# Patient Record
Sex: MALE | Race: WHITE | NOT HISPANIC OR LATINO | URBAN - METROPOLITAN AREA
[De-identification: names, ages, dates, MRNs, and addresses within clinical notes are randomized per-mention and may not be internally consistent; named-entity substitution may affect disease eponyms.]

---

## 2017-03-24 ENCOUNTER — OUTPATIENT (OUTPATIENT)
Dept: OUTPATIENT SERVICES | Facility: HOSPITAL | Age: 47
LOS: 1 days | End: 2017-03-24

## 2017-03-24 ENCOUNTER — INPATIENT (INPATIENT)
Facility: HOSPITAL | Age: 47
LOS: 9 days | Discharge: HOME | End: 2017-04-03
Payer: COMMERCIAL

## 2017-03-24 PROCEDURE — 71010: CPT | Mod: 26

## 2017-03-24 PROCEDURE — 73630 X-RAY EXAM OF FOOT: CPT | Mod: 26,LT

## 2017-03-24 PROCEDURE — 93971 EXTREMITY STUDY: CPT | Mod: 26,LT

## 2017-03-24 PROCEDURE — 99285 EMERGENCY DEPT VISIT HI MDM: CPT | Mod: 25

## 2017-03-24 PROCEDURE — 73700 CT LOWER EXTREMITY W/O DYE: CPT | Mod: 26,LT

## 2017-03-24 PROCEDURE — 73720 MRI LWR EXTREMITY W/O&W/DYE: CPT | Mod: 26,LT

## 2017-03-25 ENCOUNTER — OUTPATIENT (OUTPATIENT)
Dept: OUTPATIENT SERVICES | Facility: HOSPITAL | Age: 47
LOS: 1 days | End: 2017-03-25

## 2017-03-26 ENCOUNTER — OUTPATIENT (OUTPATIENT)
Dept: OUTPATIENT SERVICES | Facility: HOSPITAL | Age: 47
LOS: 1 days | End: 2017-03-26

## 2017-03-26 PROCEDURE — 74177 CT ABD & PELVIS W/CONTRAST: CPT | Mod: 26

## 2017-03-27 ENCOUNTER — OUTPATIENT (OUTPATIENT)
Dept: OUTPATIENT SERVICES | Facility: HOSPITAL | Age: 47
LOS: 1 days | End: 2017-03-27

## 2017-03-28 ENCOUNTER — OUTPATIENT (OUTPATIENT)
Dept: OUTPATIENT SERVICES | Facility: HOSPITAL | Age: 47
LOS: 1 days | End: 2017-03-28

## 2017-03-28 PROCEDURE — 93926 LOWER EXTREMITY STUDY: CPT | Mod: 26,LT

## 2017-03-29 ENCOUNTER — OUTPATIENT (OUTPATIENT)
Dept: OUTPATIENT SERVICES | Facility: HOSPITAL | Age: 47
LOS: 1 days | End: 2017-03-29

## 2017-03-30 ENCOUNTER — OUTPATIENT (OUTPATIENT)
Dept: OUTPATIENT SERVICES | Facility: HOSPITAL | Age: 47
LOS: 1 days | End: 2017-03-30

## 2017-03-31 ENCOUNTER — OUTPATIENT (OUTPATIENT)
Dept: OUTPATIENT SERVICES | Facility: HOSPITAL | Age: 47
LOS: 1 days | End: 2017-03-31

## 2017-04-01 ENCOUNTER — OUTPATIENT (OUTPATIENT)
Dept: OUTPATIENT SERVICES | Facility: HOSPITAL | Age: 47
LOS: 1 days | End: 2017-04-01

## 2017-04-02 ENCOUNTER — OUTPATIENT (OUTPATIENT)
Dept: OUTPATIENT SERVICES | Facility: HOSPITAL | Age: 47
LOS: 1 days | End: 2017-04-02

## 2017-05-15 ENCOUNTER — TRANSCRIPTION ENCOUNTER (OUTPATIENT)
Age: 47
End: 2017-05-15

## 2017-05-15 ENCOUNTER — OUTPATIENT (OUTPATIENT)
Dept: OUTPATIENT SERVICES | Facility: HOSPITAL | Age: 47
LOS: 1 days | End: 2017-05-15
Payer: COMMERCIAL

## 2017-05-15 VITALS
DIASTOLIC BLOOD PRESSURE: 93 MMHG | TEMPERATURE: 98 F | HEART RATE: 90 BPM | HEIGHT: 72.5 IN | SYSTOLIC BLOOD PRESSURE: 143 MMHG | OXYGEN SATURATION: 97 % | WEIGHT: 315 LBS | RESPIRATION RATE: 12 BRPM

## 2017-05-15 VITALS
RESPIRATION RATE: 14 BRPM | DIASTOLIC BLOOD PRESSURE: 72 MMHG | HEART RATE: 88 BPM | OXYGEN SATURATION: 93 % | SYSTOLIC BLOOD PRESSURE: 107 MMHG

## 2017-05-15 DIAGNOSIS — H33.41 TRACTION DETACHMENT OF RETINA, RIGHT EYE: ICD-10-CM

## 2017-05-15 DIAGNOSIS — Z89.9 ACQUIRED ABSENCE OF LIMB, UNSPECIFIED: Chronic | ICD-10-CM

## 2017-05-15 DIAGNOSIS — Z98.890 OTHER SPECIFIED POSTPROCEDURAL STATES: Chronic | ICD-10-CM

## 2017-05-15 PROCEDURE — C1889: CPT

## 2017-05-15 PROCEDURE — 67043 VIT FOR MEMBRANE DISSECT: CPT | Mod: LT

## 2017-05-15 NOTE — ASU PATIENT PROFILE, ADULT - PMH
Diabetes    HTN (hypertension)    MRSA infection  s/p  Open wound  left foot being treated in hyperbaric chamber  ANGY on CPAP    Pulmonary embolism  double pe july 2016

## 2017-05-15 NOTE — ASU DISCHARGE PLAN (ADULT/PEDIATRIC). - NOTIFY
Inability to Tolerate Liquids or Foods/Persistent Nausea and Vomiting/Unable to Urinate/Pain not relieved by Medications/Swelling that continues/Bleeding that does not stop/Fever greater than 101/Increased Irritability or Sluggishness

## 2017-05-15 NOTE — ASU DISCHARGE PLAN (ADULT/PEDIATRIC). - YOU WERE IN THE HOSPITAL FOR:
Left eye surgery for left eye vitreous hemorrhage and tractional retina detachment in the setting of proliferative diabetic retinopathy

## 2019-07-25 ENCOUNTER — HOSPITAL ENCOUNTER (INPATIENT)
Dept: HOSPITAL 35 - ER | Age: 49
LOS: 6 days | Discharge: HOME HEALTH | DRG: 616 | End: 2019-07-31
Attending: HOSPITALIST | Admitting: HOSPITALIST
Payer: COMMERCIAL

## 2019-07-25 VITALS — SYSTOLIC BLOOD PRESSURE: 145 MMHG | DIASTOLIC BLOOD PRESSURE: 85 MMHG

## 2019-07-25 VITALS — HEIGHT: 72.99 IN | WEIGHT: 315 LBS | BODY MASS INDEX: 41.75 KG/M2

## 2019-07-25 VITALS — SYSTOLIC BLOOD PRESSURE: 147 MMHG | DIASTOLIC BLOOD PRESSURE: 85 MMHG

## 2019-07-25 VITALS — DIASTOLIC BLOOD PRESSURE: 92 MMHG | SYSTOLIC BLOOD PRESSURE: 156 MMHG

## 2019-07-25 VITALS — DIASTOLIC BLOOD PRESSURE: 93 MMHG | SYSTOLIC BLOOD PRESSURE: 153 MMHG

## 2019-07-25 DIAGNOSIS — E43: ICD-10-CM

## 2019-07-25 DIAGNOSIS — E11.42: ICD-10-CM

## 2019-07-25 DIAGNOSIS — E66.9: ICD-10-CM

## 2019-07-25 DIAGNOSIS — Z89.422: ICD-10-CM

## 2019-07-25 DIAGNOSIS — L03.116: ICD-10-CM

## 2019-07-25 DIAGNOSIS — Z23: ICD-10-CM

## 2019-07-25 DIAGNOSIS — Z86.711: ICD-10-CM

## 2019-07-25 DIAGNOSIS — E11.628: ICD-10-CM

## 2019-07-25 DIAGNOSIS — Z86.718: ICD-10-CM

## 2019-07-25 DIAGNOSIS — E11.69: Primary | ICD-10-CM

## 2019-07-25 DIAGNOSIS — Z79.899: ICD-10-CM

## 2019-07-25 DIAGNOSIS — M86.172: ICD-10-CM

## 2019-07-25 DIAGNOSIS — I96: ICD-10-CM

## 2019-07-25 DIAGNOSIS — E11.621: ICD-10-CM

## 2019-07-25 DIAGNOSIS — I10: ICD-10-CM

## 2019-07-25 DIAGNOSIS — F32.9: ICD-10-CM

## 2019-07-25 LAB
ALBUMIN SERPL-MCNC: 2.7 G/DL (ref 3.4–5)
ALT SERPL-CCNC: 10 U/L (ref 30–65)
ANION GAP SERPL CALC-SCNC: 10 MMOL/L (ref 7–16)
AST SERPL-CCNC: 8 U/L (ref 15–37)
BASOPHILS NFR BLD AUTO: 0.7 % (ref 0–2)
BILIRUB SERPL-MCNC: 0.2 MG/DL
BILIRUB UR-MCNC: NEGATIVE MG/DL
BUN SERPL-MCNC: 16 MG/DL (ref 7–18)
CALCIUM SERPL-MCNC: 9.4 MG/DL (ref 8.5–10.1)
CHLORIDE SERPL-SCNC: 104 MMOL/L (ref 98–107)
CO2 SERPL-SCNC: 25 MMOL/L (ref 21–32)
COLOR UR: YELLOW
CREAT SERPL-MCNC: 0.9 MG/DL (ref 0.7–1.3)
EOSINOPHIL NFR BLD: 0.8 % (ref 0–3)
ERYTHROCYTE [DISTWIDTH] IN BLOOD BY AUTOMATED COUNT: 13.9 % (ref 10.5–14.5)
GLUCOSE SERPL-MCNC: 164 MG/DL (ref 74–106)
GRANULOCYTES NFR BLD MANUAL: 69.9 % (ref 36–66)
HCT VFR BLD CALC: 39.9 % (ref 42–52)
HGB BLD-MCNC: 13.2 GM/DL (ref 14–18)
KETONES UR STRIP-MCNC: NEGATIVE MG/DL
LYMPHOCYTES NFR BLD AUTO: 21.4 % (ref 24–44)
MCH RBC QN AUTO: 28.8 PG (ref 26–34)
MCHC RBC AUTO-ENTMCNC: 33.1 G/DL (ref 28–37)
MCV RBC: 86.9 FL (ref 80–100)
MONOCYTES NFR BLD: 7.2 % (ref 1–8)
NEUTROPHILS # BLD: 7.4 THOU/UL (ref 1.4–8.2)
PLATELET # BLD: 328 THOU/UL (ref 150–400)
POTASSIUM SERPL-SCNC: 4.5 MMOL/L (ref 3.5–5.1)
PROT SERPL-MCNC: 8.3 G/DL (ref 6.4–8.2)
RBC # BLD AUTO: 4.6 MIL/UL (ref 4.5–6)
RBC # UR STRIP: (no result) /UL
SODIUM SERPL-SCNC: 139 MMOL/L (ref 136–145)
SP GR UR STRIP: 1.01 (ref 1–1.03)
SQUAMOUS: (no result) /LPF (ref 0–3)
URINE CLARITY: CLEAR
URINE GLUCOSE-RANDOM*: (no result)
URINE LEUKOCYTES-REFLEX: NEGATIVE
URINE NITRITE-REFLEX: NEGATIVE
URINE PROTEIN (DIPSTICK): (no result)
URINE WBC-REFLEX: (no result) /HPF (ref 0–5)
UROBILINOGEN UR STRIP-ACNC: 0.2 E.U./DL (ref 0.2–1)
WBC # BLD AUTO: 10.5 THOU/UL (ref 4–11)

## 2019-07-25 PROCEDURE — 57091: CPT

## 2019-07-25 PROCEDURE — 56527: CPT

## 2019-07-25 PROCEDURE — 50386 REMOVE STENT VIA TRANSURETH: CPT

## 2019-07-25 PROCEDURE — 62900: CPT

## 2019-07-25 PROCEDURE — 10084: CPT

## 2019-07-25 PROCEDURE — 56528: CPT

## 2019-07-25 PROCEDURE — 70005: CPT

## 2019-07-25 PROCEDURE — 50010 RENAL EXPLORATION: CPT

## 2019-07-25 PROCEDURE — 62110: CPT

## 2019-07-25 PROCEDURE — 27000 TENOTOMY ADDUCTOR HIP PERQ: CPT

## 2019-07-25 PROCEDURE — 50101: CPT

## 2019-07-25 PROCEDURE — 56526: CPT

## 2019-07-25 NOTE — NUR
ASSUMED CARE AT 1700, ADMISSION HISTORY AND EDUCATION DONE, MEDS GIVEN, ORDER
ACKNOWLEDGED. WOUND PICTURE TAKEN. WILL CONTINUE TO ASSESS AND ASSIST WITH
ADLs AS NEEDED.

## 2019-07-26 VITALS — SYSTOLIC BLOOD PRESSURE: 167 MMHG | DIASTOLIC BLOOD PRESSURE: 87 MMHG

## 2019-07-26 VITALS — DIASTOLIC BLOOD PRESSURE: 85 MMHG | SYSTOLIC BLOOD PRESSURE: 133 MMHG

## 2019-07-26 VITALS — SYSTOLIC BLOOD PRESSURE: 130 MMHG | DIASTOLIC BLOOD PRESSURE: 80 MMHG

## 2019-07-26 VITALS — SYSTOLIC BLOOD PRESSURE: 165 MMHG | DIASTOLIC BLOOD PRESSURE: 101 MMHG

## 2019-07-26 LAB
ANION GAP SERPL CALC-SCNC: 7 MMOL/L (ref 7–16)
BASOPHILS NFR BLD AUTO: 0.7 % (ref 0–2)
BUN SERPL-MCNC: 15 MG/DL (ref 7–18)
CALCIUM SERPL-MCNC: 9.2 MG/DL (ref 8.5–10.1)
CHLORIDE SERPL-SCNC: 105 MMOL/L (ref 98–107)
CO2 SERPL-SCNC: 26 MMOL/L (ref 21–32)
CREAT SERPL-MCNC: 1 MG/DL (ref 0.7–1.3)
EOSINOPHIL NFR BLD: 1.5 % (ref 0–3)
ERYTHROCYTE [DISTWIDTH] IN BLOOD BY AUTOMATED COUNT: 13.7 % (ref 10.5–14.5)
GLUCOSE SERPL-MCNC: 103 MG/DL (ref 74–106)
GRANULOCYTES NFR BLD MANUAL: 64.6 % (ref 36–66)
HCT VFR BLD CALC: 38.2 % (ref 42–52)
HGB BLD-MCNC: 12.5 GM/DL (ref 14–18)
LYMPHOCYTES NFR BLD AUTO: 24.7 % (ref 24–44)
MAGNESIUM SERPL-MCNC: 1.9 MG/DL (ref 1.8–2.4)
MCH RBC QN AUTO: 28.6 PG (ref 26–34)
MCHC RBC AUTO-ENTMCNC: 32.7 G/DL (ref 28–37)
MCV RBC: 87.5 FL (ref 80–100)
MONOCYTES NFR BLD: 8.5 % (ref 1–8)
NEUTROPHILS # BLD: 5.3 THOU/UL (ref 1.4–8.2)
PLATELET # BLD: 310 THOU/UL (ref 150–400)
POTASSIUM SERPL-SCNC: 5.3 MMOL/L (ref 3.5–5.1)
RBC # BLD AUTO: 4.36 MIL/UL (ref 4.5–6)
SODIUM SERPL-SCNC: 138 MMOL/L (ref 136–145)
WBC # BLD AUTO: 8.3 THOU/UL (ref 4–11)

## 2019-07-26 NOTE — NUR
WOUND CONSULT;
ROUNDING WITH DR TRAN AND STEPHANIE RN.  THE RIGHT GREAT TOE IS GROSSLY
INFECTED.  THE PATIENT IS MISSING TOES ON THE SAME FOOT FROM A PROIR INFECTION
AND IS WELL VERSED.  SEE DR TRAN'S NOTE.
 
RECOMMENDATION; XEROFORM, KERLIX FOR NOW.  SURGERY IS LIKLEY.
 
DISCUSSED WITH STAFF

## 2019-07-26 NOTE — NUR
Pt assessed d/t screening risk for identified wound admit and BMI >40.
Provider notes state L great toe infection. Per radiology impression,
developing osteomyelitis not excluded. Hx: DM, diabetic foot ulcer, hx MSRA,
osteo, PE/DVT, HTN. Met with pt at beside. He states appetite has been down
the last few days "because of what is currently going on" (re: infection). Ate
20% dinner last night. Weight reduction not appropriate this visit, focused
heavily on good nutrition, protein focus. Identified quality protein foods to
choose, 1-2 sources/meal, and prioritizing protein first at meals while low
appetite persists. He denies DM education d/t past education/understanding. AM
BG 93 mg/dl. K+ currently high at 5.3. Malnutrition documented in provider
note. Defer diagnosis at this time. No nutrition interventions desired by pt.
Low nutrition risk at this time w / nutrition ed completed. Follow po trends.

## 2019-07-26 NOTE — NUR
DEISYMD PT CARE 1900. PT ALERT AND ORIENTED. ADMISSION ASSESSMENT COMPLETE. IV
DRESSING C/D/I. REPORTS PAIN, SEE EMAR. DENIES N/V. WET TO DRY DRESSING
APPLIED TO TOE WOUND. CALL LIGHT AND PERSONAL BELONINGS WITHIN REACH, WILL
CONTINUE POC UNTIL EOS.

## 2019-07-26 NOTE — NUR
PATIENT DISCHARGED FROM OT AT THIS TIME. IF PATIENT HAPPENS TO NEED A TOE
AMPUTATION, WILL NEED NEW OT ORDERS TO RE-EVALUATE.

## 2019-07-26 NOTE — NUR
ASSESSMENT-PT LIVES IN AN APT ALONE.  HE WALKS O N HIS OWN AND DOES HIS OWN
ADLS.  PT WAS WORKING AND DRIVING PRIOR TO ADMISSION.  PT HAS HIS GIRLFRIEND
IN THE AREA.  CASE DISCUSSED WITH ATTENDING DR AND AWAITING WOUND CARE REC.
FOR FURTHER DC PLANS AT THIS TIME.  FOLLOWING TO ASSIST AS NEEDED.

## 2019-07-27 VITALS — SYSTOLIC BLOOD PRESSURE: 125 MMHG | DIASTOLIC BLOOD PRESSURE: 71 MMHG

## 2019-07-27 VITALS — DIASTOLIC BLOOD PRESSURE: 72 MMHG | SYSTOLIC BLOOD PRESSURE: 134 MMHG

## 2019-07-27 VITALS — SYSTOLIC BLOOD PRESSURE: 130 MMHG | DIASTOLIC BLOOD PRESSURE: 62 MMHG

## 2019-07-27 VITALS — DIASTOLIC BLOOD PRESSURE: 75 MMHG | SYSTOLIC BLOOD PRESSURE: 107 MMHG

## 2019-07-27 PROCEDURE — 0Y6N0ZB DETACHMENT AT LEFT FOOT, PARTIAL 2ND RAY, OPEN APPROACH: ICD-10-PCS | Performed by: ORTHOPAEDIC SURGERY

## 2019-07-27 PROCEDURE — 0Y6N0Z9 DETACHMENT AT LEFT FOOT, PARTIAL 1ST RAY, OPEN APPROACH: ICD-10-PCS | Performed by: ORTHOPAEDIC SURGERY

## 2019-07-27 NOTE — NUR
PATIENT ARE WAS ASSUMED AT 0715.PATIENT IS ALERT AND ORIENTED X4.PT HAS BEEN
NPO AFTER MIDNIGHT, EXCEPT FOR A FEW MEDS IN THE AM.PATIENT IS SCHEDULED FOR
SURGERY FOR L GREAT TOE AMPUTATION BY DR. Kaba.PATIENT'S VITALS ARE STABLE,
AT ROOM AIR,ACCU CHECK IS AT 77, NO INSULIN GIVEN,AND IV IS PATENT AND SALINE
LOCKED.PATIENT HAS COMPLAINTS OF PAIN 10/10, PATIENT WAS GIVEN IV PAIN
MEDS.PATIENT'S SURGERY CONSENT WAS SIGNED AND IN FRONT OF THE CHART.

## 2019-07-27 NOTE — NUR
ASSUMED PT CARE 1900. PT ALERT AND ORIENTED. REASSESSMENT COMPLETE. VSS. IV
DRESSING C/D/I. DENIES N/V. REPORTS PAIN, SEE EMAR. CALL LIGHT WITHIN REACH,
WILL CONTINUE POC UNTIL EOS

## 2019-07-28 VITALS — SYSTOLIC BLOOD PRESSURE: 130 MMHG | DIASTOLIC BLOOD PRESSURE: 77 MMHG

## 2019-07-28 VITALS — SYSTOLIC BLOOD PRESSURE: 157 MMHG | DIASTOLIC BLOOD PRESSURE: 77 MMHG

## 2019-07-28 VITALS — SYSTOLIC BLOOD PRESSURE: 153 MMHG | DIASTOLIC BLOOD PRESSURE: 88 MMHG

## 2019-07-28 VITALS — SYSTOLIC BLOOD PRESSURE: 112 MMHG | DIASTOLIC BLOOD PRESSURE: 69 MMHG

## 2019-07-28 LAB
ANION GAP SERPL CALC-SCNC: 12 MMOL/L (ref 7–16)
BUN SERPL-MCNC: 19 MG/DL (ref 7–18)
CALCIUM SERPL-MCNC: 9 MG/DL (ref 8.5–10.1)
CHLORIDE SERPL-SCNC: 104 MMOL/L (ref 98–107)
CO2 SERPL-SCNC: 24 MMOL/L (ref 21–32)
CREAT SERPL-MCNC: 1 MG/DL (ref 0.7–1.3)
ERYTHROCYTE [DISTWIDTH] IN BLOOD BY AUTOMATED COUNT: 13.8 % (ref 10.5–14.5)
GLUCOSE SERPL-MCNC: 101 MG/DL (ref 74–106)
HCT VFR BLD CALC: 38.9 % (ref 42–52)
HGB BLD-MCNC: 12.7 GM/DL (ref 14–18)
MCH RBC QN AUTO: 28.4 PG (ref 26–34)
MCHC RBC AUTO-ENTMCNC: 32.7 G/DL (ref 28–37)
MCV RBC: 86.9 FL (ref 80–100)
PLATELET # BLD: 342 THOU/UL (ref 150–400)
POTASSIUM SERPL-SCNC: 4.7 MMOL/L (ref 3.5–5.1)
RBC # BLD AUTO: 4.47 MIL/UL (ref 4.5–6)
SODIUM SERPL-SCNC: 140 MMOL/L (ref 136–145)
WBC # BLD AUTO: 8.1 THOU/UL (ref 4–11)

## 2019-07-28 NOTE — NUR
Assumed care of pt at 0700. Pt alert and oriented x4. Dressing clean and
intact on left foot. Non-weight bearing on the left lower extremity. Pain
controlled with prn pain medications. Call light within reach. Will continue
to monitor.

## 2019-07-28 NOTE — NUR
ASSUMED PT CARE 19+00. PT ALERT AND ORIENTED. REASSESSMENT COMPLETE. VSS. I
DRESSING C/D/I. DRESSUING TO L FOOT C/D/I. PT REPORTS PAIN, SEE EMAR. DENIES
N/V. CALL LIGHT WITHIN REACH, WILL CONTINUE POC UNTIL EOS.

## 2019-07-29 VITALS — SYSTOLIC BLOOD PRESSURE: 161 MMHG | DIASTOLIC BLOOD PRESSURE: 100 MMHG

## 2019-07-29 VITALS — DIASTOLIC BLOOD PRESSURE: 82 MMHG | SYSTOLIC BLOOD PRESSURE: 158 MMHG

## 2019-07-29 VITALS — DIASTOLIC BLOOD PRESSURE: 77 MMHG | SYSTOLIC BLOOD PRESSURE: 160 MMHG

## 2019-07-29 PROCEDURE — 02HV33Z INSERTION OF INFUSION DEVICE INTO SUPERIOR VENA CAVA, PERCUTANEOUS APPROACH: ICD-10-PCS | Performed by: RADIOLOGY

## 2019-07-29 NOTE — NUR
FAXED IV ABX ORDERS TO [facility] WITH [facility], ID PROGRESS NOTE AND NOTIFIED [facility] OF
NEED FOR IV ABX AT DC.  [employer] TO ISSUE A ROLLER WALKER FOR HOME ONCE
DC DATE IS KNOWN.

## 2019-07-29 NOTE — NUR
ASSUMED CARE OF PT AT 0700. ASSESSMENT AS CHARTED. A&O,X4. C/O 10/10 LEFT FOOT
AND TOE PAIN POD2, PAIN MEDS GIVEN AS ORDERED. SURGICAL DRESSING INTACT,
CHANGED TODAY BY WOUND NURSE. NEW DAILY DRESSING CHANGES ORDERED. ALFRED MORALESE. PT
UP TO CHAIR WITH P.T. TODAY. ACHS, NO INSULIN GIVEN PER SLIDING SCALE. VSS.
WILL CONTINUE TO MONITOR FREQUENTLY UNTIL EOS.

## 2019-07-29 NOTE — NUR
CONSULTED TO PLACE A PICC FOR A PATIENT DISCHARGING ON HOME IV ANTIBIOTICS.
ORDER AND CONSENT NOTED. THE PROCEDURE AS WELL AS BENIFITS AND RISK FOR DVT
AND INFECTION DISCUSSED. HE VERBALIZED UNDERSTANDING. THE LEFT UPPER ARM
BASILIC WAS WIDLEY PATENT. A #4F SINGLE LUMEN POWER PICC WAS PLACED PER
HOSPITAL POLICY AFTER A BEDSIDE TIMEOUT WAS COMPLETE. LINE WAS 55CM AND
ADVANCED WITHOUT DIFFICULTY. A STAT CHEST XRAY WAS ORDERED TO CONFIRM
PLACEMENT

## 2019-07-29 NOTE — NUR
A VERY DELIGHTFUL GENTLEMAN. LEFT GREAT TOE AMPUTATION SITE DRESSING DRY AND
INTACT. FOOT WARM AND PINK. DILAUDID FOR PAIN. SLEPT AT INTERVALS TONIGHT
VOIDING. WILL CONT TO MONITOR.

## 2019-07-29 NOTE — NUR
Spoke with PT today who reports patient needs a bariatric knee scooter not
offered here at [facility]. Patient weight 350. Sp with [facility] they do not
have any Knee scooters. Sp with [facility] they have no bariatric scooters
encouraged casemgt to contact [employer], they do not contract with [facility]. sp
[facility]. Cont to inquire into
company provider.

## 2019-07-30 VITALS — DIASTOLIC BLOOD PRESSURE: 83 MMHG | SYSTOLIC BLOOD PRESSURE: 136 MMHG

## 2019-07-30 VITALS — SYSTOLIC BLOOD PRESSURE: 136 MMHG | DIASTOLIC BLOOD PRESSURE: 83 MMHG

## 2019-07-30 VITALS — SYSTOLIC BLOOD PRESSURE: 149 MMHG | DIASTOLIC BLOOD PRESSURE: 96 MMHG

## 2019-07-30 VITALS — SYSTOLIC BLOOD PRESSURE: 116 MMHG | DIASTOLIC BLOOD PRESSURE: 72 MMHG

## 2019-07-30 NOTE — NUR
IV NURSE HERE TO SHOW PT HOW TO USE PICC LINE FOR ANTIBIOTICS. ADM DILAUDID
1.5MG IV FOR PAIN TO LEFT FOOT. PT HAS BEEN UP IN CHAIR SINCE AROUND NOON.

## 2019-07-30 NOTE — NUR
PROVIDED PT WITH HIS IV ABX COVERAGE AS FOLLOWS HE Has  A 81559 ded which he
has met $160.09 and a $3500 out of pocket max which he has met $1755.56, he
understands that depening on who bills first the hospital or the infusion
company until he meets deductible his cost will be $701.05 then his 20% cost
will drop to $140.21 per week.  HE IS NOT INTERESTED IN GOING TO A SKILLED
UNIT.  [employer] ISSUED HIM A HEAVY DUTY ROLLER WALKER.  HE SAYS HIS
GIRLFRIEND WILL BE ABLE TO TRANSPORT HIM HOME.  GIRLFRIEND IS A NURSE
PRACTIONER.  PT HAS DONE IV ABX IN THE PAST.  ALERTED [facility] TO START CARE
TOMORROW.  PT WILL BE COMFORTABLE DOING HIS 2300 DOSE TONIGHT.  STILL AWAITING
FINAL DC ORDERS FOR TODAY.  TALA FROM [employer] HERE TO DO IV ABX TEACHING
THIS AFTERNOON.

## 2019-07-30 NOTE — NUR
PT LYING IN BED. PT COMPLAINING OF PAIN TO LEFT FOOT OF 10 ON 1-10 SCALE. PT
HAS PEDAL PULSE TO LEFT FOOT +2. NO SWELLING NOTICED. PT HAS PICC LINE TO LEFT
UPPER ARM. PT NOT ABLE TO BEAR WEIGHT TO LEFT FOOT. PT STATED LAST BM 2 DAYS
AGO. PT HAS BOWEL SOUNDS. GIVING PO HYDROCODONE, PT STATED HE WANTS DILAUDID
NOW. ENCOURAGED PT TO TAKE PO MED SO IT WILL LAST LONGER. PT TAKING DILAUDID
ON REG. BASIS. REMINDED PT HE WILL NOT BE ABLE TO TAKE IV PAIN MED AT HOME. PT
LUNGS CLEAR. ADM HYDROCODONE 5MG PO AT 0954, THEN DILAUDID 1.5MG IV AT 1001.

## 2019-07-30 NOTE — NUR
ADM DILAUDID 1.5MG IV FOR PAIN TO LEFT FOOT OF 10 ON 1-10 SCALE. HIS RIDE WILL
NOT BE ABLE TO GET HIM UNTIL REAL LATE TONIGHT, GIRLFRIEND FLIGHT DELAYED AT
THIS TIME. PT HAS BEEN UP IN CHAIR SINCE 1300.

## 2019-07-31 VITALS — SYSTOLIC BLOOD PRESSURE: 136 MMHG | DIASTOLIC BLOOD PRESSURE: 83 MMHG

## 2019-07-31 VITALS — SYSTOLIC BLOOD PRESSURE: 174 MMHG | DIASTOLIC BLOOD PRESSURE: 62 MMHG

## 2019-07-31 VITALS — DIASTOLIC BLOOD PRESSURE: 93 MMHG | SYSTOLIC BLOOD PRESSURE: 146 MMHG

## 2019-07-31 VITALS — SYSTOLIC BLOOD PRESSURE: 128 MMHG | DIASTOLIC BLOOD PRESSURE: 75 MMHG

## 2019-07-31 NOTE — NUR
DISCHARGE PAPERS GONE OVER SIGNED AND COPY IN CHART. PICC IV IN PLACE TO LEFT
UPPER ARM. PT TO HAVE HOME HEALTH AND IV ABT. ALL BELONGINGS PACKED AND SENT
WITH PATIENT.

## 2019-07-31 NOTE — NUR
ORDER FOR HEEL/TOE OFFLOADING BOOT OBTAINED AND FAXED TO [facility] REP HERE TO
DELIVER ITEM AND PLACE ON PT'S FOOT.  PHY TX HERE TO MAKE SURE PT AWARE HE IS
TO BE NON-WT BEARING BUT CAN USE THIS FOR BALANCE ONLY WITH WALKER.  DISCUSSED
WITH ANANYA ALVARES NP.

## 2019-07-31 NOTE — NUR
ASSUMED CARE OF PT @1900. PT ASSESSED AT START OF SHIFT SITTING UP IN CHAIR
PT A&OX4 WITH C/O LEFT FOOT PAIN. PAIN MED GIVEN FOR MANAGEMENT SEE EMAR.
ABX INFUSING AND POC DONE. GIRLFRIEND ARRIVED TO UNIT AND AT BEDSIDE FOR THE
NIGHT. @0130 PT TRANSFERRED BACK IN BED FOR THE NIGHT. WILL CONTINUE TO
MONITOR TILL EOS

## 2019-07-31 NOTE — NUR
BARIATRIC KNEE SCOOTER LOCATED ON [employer] AND ALSO ON [employer] WEBSITE FOR
AROUNF $145.  PT WAS PROVIDED THIS INFORMTION.  FINAL ORDERS FAXED TO [facility]
CARE INFUSION THIS AM.

## 2019-08-07 ENCOUNTER — HOSPITAL ENCOUNTER (OUTPATIENT)
Dept: HOSPITAL 35 - HYPER | Age: 49
End: 2019-08-07
Attending: SPECIALIST
Payer: COMMERCIAL

## 2019-08-07 DIAGNOSIS — T87.89: Primary | ICD-10-CM

## 2019-08-07 DIAGNOSIS — E40: ICD-10-CM

## 2019-08-07 DIAGNOSIS — E11.40: ICD-10-CM

## 2019-08-07 DIAGNOSIS — M86.372: ICD-10-CM

## 2019-08-07 DIAGNOSIS — E11.69: ICD-10-CM

## 2019-08-07 DIAGNOSIS — R60.0: ICD-10-CM

## 2019-08-07 DIAGNOSIS — Z86.718: ICD-10-CM

## 2019-08-07 DIAGNOSIS — E11.621: ICD-10-CM

## 2019-08-07 DIAGNOSIS — L03.116: ICD-10-CM
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
## 2019-08-07 DIAGNOSIS — L97.512: ICD-10-CM

## 2019-08-07 DIAGNOSIS — Z86.711: ICD-10-CM

## 2019-08-07 DIAGNOSIS — Z79.84: ICD-10-CM

## 2019-08-15 ENCOUNTER — HOSPITAL ENCOUNTER (OUTPATIENT)
Dept: HOSPITAL 35 - HYPER | Age: 49
End: 2019-08-15
Attending: NURSE PRACTITIONER
Payer: COMMERCIAL

## 2019-08-15 DIAGNOSIS — L03.116: ICD-10-CM

## 2019-08-15 DIAGNOSIS — Z89.422: ICD-10-CM

## 2019-08-15 DIAGNOSIS — M86.372: ICD-10-CM

## 2019-08-15 DIAGNOSIS — Z86.711: ICD-10-CM

## 2019-08-15 DIAGNOSIS — L03.115: ICD-10-CM

## 2019-08-15 DIAGNOSIS — E11.621: ICD-10-CM

## 2019-08-15 DIAGNOSIS — L97.512: ICD-10-CM

## 2019-08-15 DIAGNOSIS — Z86.718: ICD-10-CM

## 2019-08-15 DIAGNOSIS — E11.40: ICD-10-CM

## 2019-08-15 DIAGNOSIS — R60.0: ICD-10-CM

## 2019-08-15 DIAGNOSIS — E11.69: ICD-10-CM
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
## 2019-08-15 DIAGNOSIS — T81.89XD: Primary | ICD-10-CM

## 2019-08-15 DIAGNOSIS — Z79.84: ICD-10-CM

## 2019-08-15 DIAGNOSIS — E44.0: ICD-10-CM

## 2019-09-05 ENCOUNTER — HOSPITAL ENCOUNTER (OUTPATIENT)
Dept: HOSPITAL 35 - HYPER | Age: 49
End: 2019-09-05
Attending: PREVENTIVE MEDICINE
Payer: COMMERCIAL

## 2019-09-05 DIAGNOSIS — L84: ICD-10-CM

## 2019-09-05 DIAGNOSIS — Z86.718: ICD-10-CM
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
## 2019-09-05 DIAGNOSIS — L03.115: ICD-10-CM

## 2019-09-05 DIAGNOSIS — M86.372: ICD-10-CM

## 2019-09-05 DIAGNOSIS — L03.116: ICD-10-CM

## 2019-09-05 DIAGNOSIS — E11.69: ICD-10-CM

## 2019-09-05 DIAGNOSIS — E11.621: ICD-10-CM

## 2019-09-05 DIAGNOSIS — L97.512: ICD-10-CM

## 2019-09-05 DIAGNOSIS — E11.40: ICD-10-CM

## 2019-09-05 DIAGNOSIS — R60.0: ICD-10-CM

## 2019-09-05 DIAGNOSIS — T81.89XD: Primary | ICD-10-CM

## 2019-09-05 DIAGNOSIS — Z89.422: ICD-10-CM

## 2019-09-05 DIAGNOSIS — Z79.84: ICD-10-CM

## 2019-09-05 DIAGNOSIS — E44.0: ICD-10-CM

## 2019-12-12 ENCOUNTER — HOSPITAL ENCOUNTER (OUTPATIENT)
Dept: HOSPITAL 35 - HYPER | Age: 49
End: 2019-12-12
Attending: EMERGENCY MEDICINE
Payer: COMMERCIAL

## 2019-12-12 DIAGNOSIS — Z86.718: ICD-10-CM

## 2019-12-12 DIAGNOSIS — Z79.84: ICD-10-CM
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
## 2019-12-12 DIAGNOSIS — M86.372: ICD-10-CM

## 2019-12-12 DIAGNOSIS — E11.69: ICD-10-CM

## 2019-12-12 DIAGNOSIS — E11.40: ICD-10-CM

## 2019-12-12 DIAGNOSIS — L03.116: ICD-10-CM

## 2019-12-12 DIAGNOSIS — T81.31XD: Primary | ICD-10-CM

## 2019-12-12 DIAGNOSIS — E44.0: ICD-10-CM

## 2019-12-12 DIAGNOSIS — Z89.422: ICD-10-CM

## 2019-12-12 DIAGNOSIS — Z86.711: ICD-10-CM

## 2019-12-12 DIAGNOSIS — L84: ICD-10-CM

## 2019-12-12 DIAGNOSIS — L03.115: ICD-10-CM

## 2019-12-12 DIAGNOSIS — R60.0: ICD-10-CM

## 2019-12-31 ENCOUNTER — HOSPITAL ENCOUNTER (OUTPATIENT)
Dept: HOSPITAL 35 - HYPER | Age: 49
End: 2019-12-31
Attending: PREVENTIVE MEDICINE
Payer: COMMERCIAL

## 2019-12-31 DIAGNOSIS — L84: ICD-10-CM
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
## 2019-12-31 DIAGNOSIS — Z86.718: ICD-10-CM

## 2019-12-31 DIAGNOSIS — E11.69: ICD-10-CM

## 2019-12-31 DIAGNOSIS — L03.116: ICD-10-CM

## 2019-12-31 DIAGNOSIS — Z79.84: ICD-10-CM

## 2019-12-31 DIAGNOSIS — L03.115: ICD-10-CM

## 2019-12-31 DIAGNOSIS — M86.372: ICD-10-CM

## 2019-12-31 DIAGNOSIS — Z89.422: ICD-10-CM

## 2019-12-31 DIAGNOSIS — R60.0: ICD-10-CM

## 2019-12-31 DIAGNOSIS — E11.40: ICD-10-CM

## 2019-12-31 DIAGNOSIS — L97.521: ICD-10-CM

## 2019-12-31 DIAGNOSIS — E11.621: ICD-10-CM

## 2019-12-31 DIAGNOSIS — T81.31XD: Primary | ICD-10-CM

## 2019-12-31 DIAGNOSIS — E44.0: ICD-10-CM

## 2020-01-16 ENCOUNTER — HOSPITAL ENCOUNTER (OUTPATIENT)
Dept: HOSPITAL 35 - HYPER | Age: 50
End: 2020-01-16
Attending: PREVENTIVE MEDICINE
Payer: COMMERCIAL

## 2020-01-16 DIAGNOSIS — Z86.718: ICD-10-CM

## 2020-01-16 DIAGNOSIS — T81.31XD: Primary | ICD-10-CM

## 2020-01-16 DIAGNOSIS — E44.0: ICD-10-CM

## 2020-01-16 DIAGNOSIS — E11.621: ICD-10-CM

## 2020-01-16 DIAGNOSIS — Z86.711: ICD-10-CM

## 2020-01-16 DIAGNOSIS — L97.522: ICD-10-CM

## 2020-01-16 DIAGNOSIS — E11.69: ICD-10-CM

## 2020-01-16 DIAGNOSIS — E11.40: ICD-10-CM
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
## 2020-01-16 DIAGNOSIS — M86.372: ICD-10-CM

## 2020-01-16 DIAGNOSIS — S91.302D: ICD-10-CM

## 2020-01-16 DIAGNOSIS — R60.0: ICD-10-CM

## 2020-01-16 DIAGNOSIS — Z89.422: ICD-10-CM

## 2020-01-16 DIAGNOSIS — Z79.84: ICD-10-CM

## 2020-01-28 ENCOUNTER — HOSPITAL ENCOUNTER (INPATIENT)
Dept: HOSPITAL 35 - ER | Age: 50
LOS: 7 days | Discharge: HOME HEALTH | DRG: 853 | End: 2020-02-04
Attending: INTERNAL MEDICINE | Admitting: INTERNAL MEDICINE
Payer: COMMERCIAL

## 2020-01-28 VITALS — SYSTOLIC BLOOD PRESSURE: 175 MMHG | DIASTOLIC BLOOD PRESSURE: 80 MMHG

## 2020-01-28 VITALS — SYSTOLIC BLOOD PRESSURE: 144 MMHG | DIASTOLIC BLOOD PRESSURE: 79 MMHG

## 2020-01-28 VITALS
WEIGHT: 315 LBS | BODY MASS INDEX: 41.75 KG/M2 | HEIGHT: 72.99 IN | DIASTOLIC BLOOD PRESSURE: 98 MMHG | SYSTOLIC BLOOD PRESSURE: 191 MMHG

## 2020-01-28 VITALS — DIASTOLIC BLOOD PRESSURE: 80 MMHG | SYSTOLIC BLOOD PRESSURE: 175 MMHG

## 2020-01-28 DIAGNOSIS — N17.0: ICD-10-CM

## 2020-01-28 DIAGNOSIS — E11.22: ICD-10-CM

## 2020-01-28 DIAGNOSIS — Z79.899: ICD-10-CM

## 2020-01-28 DIAGNOSIS — N17.9: ICD-10-CM

## 2020-01-28 DIAGNOSIS — Z86.711: ICD-10-CM

## 2020-01-28 DIAGNOSIS — Z86.718: ICD-10-CM

## 2020-01-28 DIAGNOSIS — N18.9: ICD-10-CM

## 2020-01-28 DIAGNOSIS — Z79.01: ICD-10-CM

## 2020-01-28 DIAGNOSIS — I12.9: ICD-10-CM

## 2020-01-28 DIAGNOSIS — Z79.4: ICD-10-CM

## 2020-01-28 DIAGNOSIS — L97.529: ICD-10-CM

## 2020-01-28 DIAGNOSIS — E11.621: ICD-10-CM

## 2020-01-28 DIAGNOSIS — Z89.429: ICD-10-CM

## 2020-01-28 DIAGNOSIS — Z22.322: ICD-10-CM

## 2020-01-28 DIAGNOSIS — A41.9: Primary | ICD-10-CM

## 2020-01-28 DIAGNOSIS — M86.172: ICD-10-CM

## 2020-01-28 DIAGNOSIS — E11.42: ICD-10-CM

## 2020-01-28 DIAGNOSIS — E11.69: ICD-10-CM

## 2020-01-28 LAB
ANION GAP SERPL CALC-SCNC: 9 MMOL/L (ref 7–16)
BASOPHILS NFR BLD AUTO: 0.5 % (ref 0–2)
BUN SERPL-MCNC: 33 MG/DL (ref 7–18)
CALCIUM SERPL-MCNC: 9.8 MG/DL (ref 8.5–10.1)
CHLORIDE SERPL-SCNC: 102 MMOL/L (ref 98–107)
CO2 SERPL-SCNC: 26 MMOL/L (ref 21–32)
CREAT SERPL-MCNC: 1.6 MG/DL (ref 0.7–1.3)
EOSINOPHIL NFR BLD: 1 % (ref 0–3)
ERYTHROCYTE [DISTWIDTH] IN BLOOD BY AUTOMATED COUNT: 14.5 % (ref 10.5–14.5)
GLUCOSE SERPL-MCNC: 252 MG/DL (ref 74–106)
GRANULOCYTES NFR BLD MANUAL: 77.9 % (ref 36–66)
HCT VFR BLD CALC: 38.3 % (ref 42–52)
HGB BLD-MCNC: 12.4 GM/DL (ref 14–18)
LYMPHOCYTES NFR BLD AUTO: 12.8 % (ref 24–44)
MCH RBC QN AUTO: 27.4 PG (ref 26–34)
MCHC RBC AUTO-ENTMCNC: 32.3 G/DL (ref 28–37)
MCV RBC: 84.9 FL (ref 80–100)
MONOCYTES NFR BLD: 7.8 % (ref 1–8)
NEUTROPHILS # BLD: 10.5 THOU/UL (ref 1.4–8.2)
PLATELET # BLD: 396 THOU/UL (ref 150–400)
POTASSIUM SERPL-SCNC: 4.6 MMOL/L (ref 3.5–5.1)
RBC # BLD AUTO: 4.51 MIL/UL (ref 4.5–6)
SODIUM SERPL-SCNC: 137 MMOL/L (ref 136–145)
WBC # BLD AUTO: 13.5 THOU/UL (ref 4–11)

## 2020-01-28 PROCEDURE — 70005: CPT

## 2020-01-28 PROCEDURE — 50643: CPT

## 2020-01-28 PROCEDURE — 57103: CPT

## 2020-01-28 PROCEDURE — 50386 REMOVE STENT VIA TRANSURETH: CPT

## 2020-01-28 PROCEDURE — 10102: CPT

## 2020-01-28 PROCEDURE — 10195: CPT

## 2020-01-28 PROCEDURE — 10040 EXTRACTION: CPT

## 2020-01-28 PROCEDURE — 27000 TENOTOMY ADDUCTOR HIP PERQ: CPT

## 2020-01-28 PROCEDURE — 50101: CPT

## 2020-01-28 PROCEDURE — 50010 RENAL EXPLORATION: CPT

## 2020-01-28 PROCEDURE — 53078: CPT

## 2020-01-28 PROCEDURE — 50970 URETER ENDOSCOPY: CPT

## 2020-01-28 PROCEDURE — 50445: CPT

## 2020-01-28 PROCEDURE — 62110: CPT

## 2020-01-28 PROCEDURE — 57180 TREAT VAGINAL BLEEDING: CPT

## 2020-01-28 PROCEDURE — 62900: CPT

## 2020-01-28 PROCEDURE — 57091: CPT

## 2020-01-29 VITALS — SYSTOLIC BLOOD PRESSURE: 175 MMHG | DIASTOLIC BLOOD PRESSURE: 79 MMHG

## 2020-01-29 VITALS — SYSTOLIC BLOOD PRESSURE: 146 MMHG | DIASTOLIC BLOOD PRESSURE: 57 MMHG

## 2020-01-29 VITALS — SYSTOLIC BLOOD PRESSURE: 152 MMHG | DIASTOLIC BLOOD PRESSURE: 70 MMHG

## 2020-01-29 VITALS — DIASTOLIC BLOOD PRESSURE: 73 MMHG | SYSTOLIC BLOOD PRESSURE: 145 MMHG

## 2020-01-29 LAB
ANION GAP SERPL CALC-SCNC: 16 MMOL/L (ref 7–16)
BUN SERPL-MCNC: 30 MG/DL (ref 7–18)
CALCIUM SERPL-MCNC: 8.4 MG/DL (ref 8.5–10.1)
CHLORIDE SERPL-SCNC: 104 MMOL/L (ref 98–107)
CO2 SERPL-SCNC: 19 MMOL/L (ref 21–32)
CREAT SERPL-MCNC: 1.4 MG/DL (ref 0.7–1.3)
ERYTHROCYTE [DISTWIDTH] IN BLOOD BY AUTOMATED COUNT: 14.7 % (ref 10.5–14.5)
GLUCOSE SERPL-MCNC: 165 MG/DL (ref 74–106)
HCT VFR BLD CALC: 35.9 % (ref 42–52)
HGB BLD-MCNC: 11.3 GM/DL (ref 14–18)
MCH RBC QN AUTO: 26.9 PG (ref 26–34)
MCHC RBC AUTO-ENTMCNC: 31.6 G/DL (ref 28–37)
MCV RBC: 85.3 FL (ref 80–100)
PLATELET # BLD: 354 THOU/UL (ref 150–400)
POTASSIUM SERPL-SCNC: 4.5 MMOL/L (ref 3.5–5.1)
RBC # BLD AUTO: 4.21 MIL/UL (ref 4.5–6)
SODIUM SERPL-SCNC: 139 MMOL/L (ref 136–145)
WBC # BLD AUTO: 12.2 THOU/UL (ref 4–11)

## 2020-01-29 NOTE — NUR
PT ADMITTED RELATED TO CELLULITIS, DIABETIC FOOT ULCER. CM REVIEWED CHART AND
SPOKE WITH CARE TEAM. CM MET WITH PT AT BEDSIDE THIS DAY. PT IS A&O X4. CM
ROLE INTRODUCED. PT INDICATED HE LIVES IN A RANCH STYLE HOUSE WITH 2 STEPS TO
ENTER AND 2 STEPS INSIDE. PT INDICATED SHE HE HAD BEEN INDEPENDENT WITH GAIT
AND ADLS PTA. PT HAS A GIRLFRIEND WHO MAY BE ABLE TO ASSIST IS NEEDED UPON
DC. PT INDICATED HE HAS A FWW SHOULD HE NEED IT UPON DC. PT HAD BEEN
ON SERVICE WITH [facility]ARE IN THE PAST. PT INDICATED HE PLANS TO
RETURN HOME ONCE MEDICALLY STABLE.

## 2020-01-29 NOTE — NUR
ADMITTED FROM ER UNDER 'S CARE. AXOX4. ISO FOR PREVIOUS MRSA. LFOOT
CELLULITIS WITH DIABETIC FOOT ULCER. AM VITALS NOTED WITH TACHYCARDIA, TEMP
100.6. CALLED BLAIR SUTTON ON CALL AND REPORTED ABNORMAL VITAL SIGNS. BLAIR SUTTON
SAID, PT IS BEING HYDRATED AND INITAIATED ON IV ATB, HAVING GOOD URINE OUTPUT.
MONITOR FOR FURTHER CHAGNES PER NP. WILL ENDORSE. PAIN TX PER MD ORDER. WILL
CONT TO MONITOR CLOSELY

## 2020-01-29 NOTE — NUR
Received awake on bed. Due medications given as prescribed. On nothing per
orem- pt informed and aware. Vital signs stable. On room air. A+Ox4. On blood
sugar monitoring, taken and recorded accordingly, with sliding scale insulin
given as prescribed. Continent of bowel and bladder, able to use toilet. With
NS at 100cc/hr, infusing well at L AC. With wound at L leg, dressing in place,
seen by wound team today, dressing changed. Maintained on isolation due to
MRSA. Assisted in ADLs. Mouth care and ice chips provided. A/W MRI, checklist
accomplished.
Pt with  critical value of Vanc 21.0, lab informed me at 0700am- found out
that pt's Vanc trough was supposed to be drawn at 2330 instead lab did it at
0400- Dr Juarez, lab staff and pharmacy informed, re-ordered vanc trough at
2330.
Pt went down for MRI, tolerated procedure well, transferred to room safely.Pt
seen by Dr Cai- asked if pt can resume diet or with plans to have surgery
today- as per Dr Cai, no surgery scheduled for today, to resume diet, will
a/w MRI results and possible ortho consult, ?OM.
Ortho consult called in by US, Dr Mccarty called back. Pt seen by Dr Mccarty- XR
ordered, for NPO post midnight, possible surgery if Dr Hamlin is available-
Night shift nurse informed.
Pt complaining of pain, due PRN pain medications given as prescribed.
To continue monitoring patient.

## 2020-01-29 NOTE — NUR
WOUND CARE CONSULT;
THE LEFT FOOT PLANTAR SURFACE WAS ASSESSED.  A FISSURE WITH BRUISING. NO
DRAINAGE AT THIS TIME.  THE FOOT SEEMS WARMER ON THE LEFT THAN THE RIGHT.
WE ARE AWAITING MRI RESULTS.
 
RECOMMEDNATIONS;
1-DAILY FOLLOW UP
2-A BORDER FOAM
 
DISCUSSED WITH STAFF

## 2020-01-30 VITALS — DIASTOLIC BLOOD PRESSURE: 74 MMHG | SYSTOLIC BLOOD PRESSURE: 147 MMHG

## 2020-01-30 VITALS — DIASTOLIC BLOOD PRESSURE: 69 MMHG | SYSTOLIC BLOOD PRESSURE: 120 MMHG

## 2020-01-30 VITALS — SYSTOLIC BLOOD PRESSURE: 117 MMHG | DIASTOLIC BLOOD PRESSURE: 73 MMHG

## 2020-01-30 VITALS — DIASTOLIC BLOOD PRESSURE: 62 MMHG | SYSTOLIC BLOOD PRESSURE: 120 MMHG

## 2020-01-30 VITALS — SYSTOLIC BLOOD PRESSURE: 143 MMHG | DIASTOLIC BLOOD PRESSURE: 77 MMHG

## 2020-01-30 VITALS — SYSTOLIC BLOOD PRESSURE: 116 MMHG | DIASTOLIC BLOOD PRESSURE: 64 MMHG

## 2020-01-30 VITALS — DIASTOLIC BLOOD PRESSURE: 71 MMHG | SYSTOLIC BLOOD PRESSURE: 123 MMHG

## 2020-01-30 VITALS — SYSTOLIC BLOOD PRESSURE: 144 MMHG | DIASTOLIC BLOOD PRESSURE: 73 MMHG

## 2020-01-30 LAB
ANION GAP SERPL CALC-SCNC: 11 MMOL/L (ref 7–16)
BUN SERPL-MCNC: 39 MG/DL (ref 7–18)
CALCIUM SERPL-MCNC: 8.8 MG/DL (ref 8.5–10.1)
CHLORIDE SERPL-SCNC: 103 MMOL/L (ref 98–107)
CO2 SERPL-SCNC: 21 MMOL/L (ref 21–32)
CREAT SERPL-MCNC: 1.4 MG/DL (ref 0.7–1.3)
ERYTHROCYTE [DISTWIDTH] IN BLOOD BY AUTOMATED COUNT: 14.6 % (ref 10.5–14.5)
GLUCOSE SERPL-MCNC: 160 MG/DL (ref 74–106)
HCT VFR BLD CALC: 34.1 % (ref 42–52)
HGB BLD-MCNC: 10.9 GM/DL (ref 14–18)
MCH RBC QN AUTO: 27.3 PG (ref 26–34)
MCHC RBC AUTO-ENTMCNC: 31.9 G/DL (ref 28–37)
MCV RBC: 85.6 FL (ref 80–100)
PLATELET # BLD: 357 THOU/UL (ref 150–400)
POTASSIUM SERPL-SCNC: 3.8 MMOL/L (ref 3.5–5.1)
RBC # BLD AUTO: 3.99 MIL/UL (ref 4.5–6)
SODIUM SERPL-SCNC: 135 MMOL/L (ref 136–145)
WBC # BLD AUTO: 9.9 THOU/UL (ref 4–11)

## 2020-01-30 PROCEDURE — 0QTP0ZZ RESECTION OF LEFT METATARSAL, OPEN APPROACH: ICD-10-PCS | Performed by: ORTHOPAEDIC SURGERY

## 2020-01-30 NOTE — NUR
ASSUMED CARE AROUND 1915. AXOX4. ISO MAINTAINED, KEPT NPO POST MN FOR POSSIBLE
ORTHO SURGERY IN AM. PAIN MEDS GIVEN PER MD ORDER. IV ATB TX CHANGED PER ID
. NO S/S ACUTE DISTRESS NOTED OR REPORTED AT THIS TIME. WILL CONT TO
MONITOR FOR ANY CHANGES IN CONDITION.

## 2020-01-30 NOTE — NUR
PT WENT DOWN FOR DEBRIDEMENT OF LEFT FOOT AND EXCISION OF 3RD METATARSAL. CM
TO FOLLOW AS INDICATED WITH DC PLANNING.

## 2020-01-30 NOTE — HC
[facility]
[address]   [address]                     CONSULTATION                  
_______________________________________________________________________________
 
Name:       JO ANN ESPOSITO                Room #:         453-P       ADM IN  
M.R.#:      [id number]                       Account #:      [id number]  
Admission:  01/28/20    Attend Phys:    Valentín Lin MD      
Discharge:              Date of Birth:  [birth year]  
                                                          Report #: [id number]
                                                                    [id number]   
_______________________________________________________________________________
THIS REPORT FOR:   //name//                          
 
CC: Mariano Lin
 
DATE OF SERVICE:  01/29/2020
 
 
CHIEF COMPLAINT:  Left foot infection.
 
HISTORY OF PRESENT ILLNESS:  This heavy 49-year-old gentleman has chronic
diabetes and peripheral vascular disease.  He has had previous left foot
problems with transmetatarsal amputation in the past.  He developed a small
wound at the distal aspect of the third metatarsal, which was debrided 2 months
ago.  That wound appeared to heal in, but he now has more pain and swelling. 
MRI studies suggest a persistent abscess and involvement of the third metatarsal
with suggestion of osteomyelitis extending all the way back to the base.  The
other metatarsals do not seem to be involved on MRI.
 
On clinical exam, the left foot is tender and slightly puffy, but the skin and
soft tissues appear to be generally healthy and well perfused.  The small wound
at the plantar aspect distally is actually healed and dry.  There is no
drainage.  He has only mild discomfort along the medial and lateral aspect, but
more significant discomfort in the mid foot, consistent with osteomyelitis
involving the third metatarsal.
 
I have reviewed the MRI images, but do not at this point have plain x-rays to
give me a better sense of the bony architecture.
 
I have had a lengthy discussion with the patient.  Clearly, there appears to be
an abscess and osteomyelitis involving the third metatarsal.  I think this will
require more aggressive debridement with complete removal of the third
metatarsal.  The next question is whether we can achieve soft tissue healing and
maintain length in the other metatarsals at this point.  If this is not
feasible, then shortening back to the tarsometatarsal joint level would be the
next option.  Clearly, if he has further persistent infection, then a below-knee
amputation may be required.  Today, the soft tissue appearance would suggest
that a more distal resection may be successful.  I will review new x-rays when
they are available.  I am uncertain about OR scheduling opportunities.  I will
also try to discuss this with my partner, Dr. Hamlin, who is our foot and ankle
specialist.  Pending these issues, we will talk again and consider whether
surgical debridement might be feasible.
 
 
  <ELECTRONICALLY SIGNED>
   By: Julse Mccarty MD           
  01/30/20     1151
D: 01/29/20 1746                           _____________________________________
T: 01/30/20 0447                           Jules Mccarty MD             /nt

## 2020-01-30 NOTE — NUR
PT RECEIVED SURGERY TODAY. WOUND VAC IN PLACE AND FUNCTIONAL. SANGUINOUS
DRAINAGE OF 50ML NOTED AT END OF SHIFT. PT REPORTS PAIN 10/10. MEDS GIVEN AS
ORDERED. PT PAIN DECREASED SLIGHTLY. PROVIDER NOTIFIED AND ORDERS RECEIVED.
DISCUSSED PAIN MANAGEMENT WITH PATIENT AND HIS SIGNIFICANT OTHER, BOTH
AGREEABLE WITH PLAN FOR PAIN CONTROL. PATIENT VOIDED ONETIME POSTOPERATIVELY.
DENIES ANY NAUSEA. PT BEGAN CLEAR LIQUID DIET BUT NEEDS ENCOURAGEMENT TO TAKE
IN NUTRITION. FALL PRECAUTIONS IN PLACE. Vascular exam completed

## 2020-01-30 NOTE — NUR
WOUND F/U;
I WAS ASKED TO SEE THIS PATIENT BECAUSE OF ALARMS.  THE VAC PROBLEM WAS
IDENTIFIIED AND REPAIRED.
 
DISCUSSED WITH RN

## 2020-01-31 VITALS — DIASTOLIC BLOOD PRESSURE: 64 MMHG | SYSTOLIC BLOOD PRESSURE: 118 MMHG

## 2020-01-31 VITALS — SYSTOLIC BLOOD PRESSURE: 154 MMHG | DIASTOLIC BLOOD PRESSURE: 87 MMHG

## 2020-01-31 VITALS — DIASTOLIC BLOOD PRESSURE: 89 MMHG | SYSTOLIC BLOOD PRESSURE: 155 MMHG

## 2020-01-31 VITALS — DIASTOLIC BLOOD PRESSURE: 73 MMHG | SYSTOLIC BLOOD PRESSURE: 127 MMHG

## 2020-01-31 NOTE — NUR
CONSULTED TO PLACE A PICC FOR HOME IV ANTIBIOTICS. ORDER AND CONSENT NOTED. A
#4F SINGLE LUMEN PICC WAS PLACED PER HOSPITAL POLICY AFTER A BEDSIDE TIMEOUT
WAS COMPLETE. PICC WAS TRIMMED TO 53CM AND ADVANCED WITHOUT DIFFICULTY. LINE
WAS CONFIRMED IN GOOD POSITION BY A STAT CHEST XRAY

## 2020-01-31 NOTE — HC
[facility]
[address] [place]   [address]                     CONSULTATION                  
_______________________________________________________________________________
 
Name:       JO ANN ESPOSITO                Room #:         453-P       ADM IN  
M.R.#:      [id number]                       Account #:      [id number]  
Admission:  01/28/20    Attend Phys:    Valentín Lin MD      
Discharge:              Date of Birth:  [birth year]  
                                                          Report #: [id number]
                                                                    [id number]   
_______________________________________________________________________________
THIS REPORT FOR:  //name//                      
 
cc:  Mariano Vega James A. DO                                                 ~
THIS REPORT FOR:   //name//                          
 
CC: Jules Cai JERAD Lin
 
DATE OF SERVICE:  01/30/2020
 
 
WOUND CARE CONSULTATION
 
PERSONAL PHYSICIAN:  Dr. Vega.
 
CHIEF COMPLAINT:  Left foot surgical wound.
 
HISTORY OF PRESENT ILLNESS:  This is a 49-year-old white male we have been
following in the wound clinic for a surgical wound on his left foot, secondary
to diabetic foot ulcer and subsequent osteomyelitis.  The patient had initial
surgical debridement several weeks ago by Dr. Cai, and was actually doing
fairly well until recently.  His left foot started having increased swelling
despite being on Augmentin and Bactrim.  The patient was seen by Dr. Cai in
his office, had x-rays that showed third metatarsal destruction of the proximal
shaft.  The patient then was sent to the Emergency Department and was seen in
consultation by Dr. Jules Mccarty, and was taken to the operating room where he
had debridement of left foot on the dorsal aspect and excision of the third
metatarsal.  The patient had wound VAC placed in the operating room, and we have
been asked to assist in the care of the wound at this time.
 
PAST MEDICAL HISTORY:  Significant for diabetes mellitus, acute on chronic
kidney disease, peripheral neuropathy, previous foot surgeries secondary to
osteomyelitis.  The patient has a history of hypertension, DVT, pulmonary
embolism.
 
CURRENT MEDICATIONS:  Multiple, I have reviewed the patient's medication list.
 
DRUG ALLERGIES:  None.
 
SOCIAL HISTORY:  The patient does not smoke or drink alcohol.
 
FAMILY HISTORY:  Not pertinent to current medical condition.
 
 
 
 
[address]   [address]                     CONSULTATION                  
_______________________________________________________________________________
 
Name:       JO ANN ESPOSITO                Room #:         453-P       ADM IN  
M.R.#:      [id number]                       Account #:      [id number]  
Admission:  01/28/20    Attend Phys:    Valentín Lin MD      
Discharge:              Date of Birth:  [birth year]  
                                                          Report #: [id number]
                                                                    [id number]   
_______________________________________________________________________________
REVIEW OF SYSTEMS:
CONSTITUTIONAL:  The patient denies fevers or chills at this time.
NEUROLOGIC:  The patient complains of some mild sedation at this time secondary
to the recent surgery earlier today.  Denies any numbness, tingling or weakness
in arms or legs.
EYES:  No complaints.
ENT:  No complaints.
CARDIAC:  The patient denies chest pain or palpitations.  He has chronic lower
extremity edema.
RESPIRATORY:  The patient denies shortness of breath, cough or wheezes.
GASTROINTESTINAL:  The patient denies nausea, vomiting or abdominal pain.
GENITOURINARY:  The patient denies urgency or frequency.
MUSCULOSKELETAL:  No complaints.
SKIN:  The patient has a surgical wound on the left foot with a wound VAC in
place.
 
PHYSICAL EXAMINATION:
VITAL SIGNS:  The patient is afebrile, pulse 88, respirations 16, /71.
GENERAL:  This is an alert and oriented x 3, slightly groggy white male who is
in no obvious distress.
HEENT:  He is normocephalic, atraumatic.  Mucous membranes are dry.  Pupils are
round, sclerae white.
NECK:  Supple, nontender.
LUNGS:  Clear.
HEART:  Regular.
ABDOMEN:  Soft, nontender.
EXTREMITIES:  The patient moves all extremities without difficulty.  Evaluation
of bilateral lower extremities reveals 1+ edema.  Distal pulses intact.  The
patient is neuropathic in his lower extremities.  Evaluation of the left foot
reveals a wound VAC to be in place and functioning well at 125 mmHg suction,
continuous.  The wound is on the dorsal aspect of the foot, plantar aspect of
the foot is intact.  Bilateral heels are intact.
NEUROLOGIC:  Cranial nerves 2-12 grossly intact.  Motor and sensory grossly
intact.
 
LABORATORY DATA:  White count 9.9, hemoglobin 10.9.  Sed rate 101, BUN 39,
creatinine 1.4, albumin 2.7.
 
IMPRESSION:
1.  Surgical wound on dorsal aspect of the left foot, status post excision of
osteomyelitic third metatarsal.
2.  History of third metatarsal osteomyelitis.
3.  Diabetes mellitus.
4.  Protein-calorie malnutrition ? moderate with albumin of 2.7.
5.  Hypertension.
6.  History of deep venous thrombosis with pulmonary embolism.
 
 
 
[address]   [address]                     CONSULTATION                  
_______________________________________________________________________________
 
Name:       JO ANN ESPOSITO                Room #:         453-P       [facility] [place]  
M.R.#:      [id number]                       Account #:      [id number]  
Admission:  01/28/20    Attend Phys:    Valentín Lin MD      
Discharge:              Date of Birth:  [birth year]  
                                                          Report #: [id number]
                                                                    [id number]   
_______________________________________________________________________________
 
PLAN:  We will continue with the wound VAC therapy to the left foot, 125 mmHg
continuous suction; we will change this 3 times weekly.  Continue with IV
antibiotics per Infectious Disease.  I spoke with the orthopedic surgeon, Dr. Jules Mccarty.  He said at this time, he does not plan any further orthopedic
surgical interventions, and we will continue to manage the wound and he will be
available if necessary.  We will maximize the patient's oral protein
supplementation for healing.  We will utilize physical and occupational therapy
for strengthening as the patient is able.  We will continue all other current
medications as well.  We will continue to follow the patient.
 
 
 
 
 
 
 
 
 
 
 
 
 
 
 
 
 
 
 
 
 
 
 
 
 
 
 
 
 
 
 
 
 
 
  <ELECTRONICALLY SIGNED>
   By: Андрей Liu MD          
  01/31/20     1111
D: 01/30/20 1812                           _____________________________________
T: 01/31/20 0152                           Андрей Liu MD            /nt

## 2020-01-31 NOTE — NUR
CARE TEAM INDICATED THAT PT HAD WOUND VAC PLACED YESTERDAY AND THAT PT IS
GETTING IV ABX AND CONTINUED WOUND CARE. IT IS ANTICPATED THAT PT WILL BE HERE
OVER THE WEEKEND. CM TO FOLLOW AS INDICATED WITH DC PLANNING.

## 2020-01-31 NOTE — O
[facility]
Elida [place]   [address]                     OPERATIVE REPORT              
_______________________________________________________________________________
 
Name:       JO ANN ESPOSITO                Room #:         453-P       ADM IN  
M.R.#:      [id number]                       Account #:      [id number]  
Admission:  01/28/20    Attend Phys:    Valentín Lin MD      
Discharge:              Date of Birth:  [birth year]  
                                                          Report #: [id number]
                                                                    [id number]   
_______________________________________________________________________________
THIS REPORT FOR:   //name//                          
 
CC: Mariano Lin
 
DATE OF SERVICE:  01/30/2020
 
 
PREOPERATIVE DIAGNOSIS:  Infected left foot with third metatarsal osteomyelitis
and abscess.
 
POSTOPERATIVE DIAGNOSIS:  Infected left foot with third metatarsal osteomyelitis
and abscess.
 
PROCEDURE:  Debridement, left foot with excision of infected third metatarsal.
 
SURGEON:  Jules Mccarty MD
 
INDICATIONS:  This heavy 49-year-old gentleman with chronic diabetes and
peripheral vascular disease has had multiple previous surgical procedures on the
left foot.  He now has a persistent wound, infected with MRSA and evidence of
osteomyelitis involving the third metatarsal.  This extends through most of that
bone, but the other mid foot does not seem to be involved.  We have discussed
treatment options including a very short mid foot amputation or below-knee
amputation.  He understands, but hopes to preserve his foot if possible. 
Therefore, we have elected to go ahead with debridement of the obviously
infected third metatarsal and associated soft tissues.  He understands this may
or may not heal in and he may require more proximal amputation in the future.
 
DESCRIPTION OF PROCEDURE:  The patient was taken to the operating room where he
was placed under general anesthesia.  He is already on an antibiotic regimen. 
The left foot and leg were meticulously prepped and draped and a thigh
tourniquet inflated to 350 mmHg.  A dorsal longitudinal skin incision was made
overlying the third metatarsal.  This was carried through subcutaneous tissues
to expose the bone.  There was obvious atrophy and disruption of the bone with
surrounding soft tissue edema consistent with infection or abscess.  An
aggressive debridement of both the soft tissues and any remnants of the third
metatarsal was performed.  The second and fourth metatarsals seemed to be intact
with reasonable strength and stability.  I did not feel further debridement of
those bones was necessary.  The more distal aspect of the wound extended to the
small skin wound, which had been previously debrided some months ago at the
distal plantar aspect.  This area was included in the wound and aggressively
irrigated using pulsatile lavage.  A C-arm view was obtained demonstrating that
there was complete resection of the third metatarsal.  The other bones appear to
be intact and stable on this plain x-ray.  The tourniquet was deflated.  Good
hemostasis was established.  There seems to be adequate blood flow to the soft
 
 
 
[address]   [address]                     OPERATIVE REPORT              
_______________________________________________________________________________
 
Name:       JO ANN ESPOSITO                Room #:         453-P       [facility] [place]  
M.R.#:      [id number]                       Account #:      [id number]  
Admission:  01/28/20    Attend Phys:    Valentín Lin MD      
Discharge:              Date of Birth:  [birth year]  
                                                          Report #: [id number]
                                                                    [id number]   
_______________________________________________________________________________
tissues.  A wound VAC was placed in the wound.  Sterile dressing was applied. 
The patient was awakened and returned to recovery room in good condition.
 
 
 
 
 
 
 
 
 
 
 
 
 
 
 
 
 
 
 
 
 
 
 
 
 
 
 
 
 
 
 
 
 
 
 
 
 
 
 
 
 
 
  <ELECTRONICALLY SIGNED>
   By: Jules Mccarty MD           
  01/31/20     0732
D: 01/30/20 1147                           _____________________________________
T: 01/30/20 1213                           Jules Mccarty MD             /nt

## 2020-01-31 NOTE — NUR
Nutrition: Pt assessed for BMI > 40 (49.5 kg/m2). Also seen for identification
of wounds. Admit: cellulitis, diabetic foot ulcer. Is s/p surgery 1/30 with
debridement of L foot and excision of 3rd metatarsal. Has a wound vac. Per
EMR, may possibly need more aggressive, higher amputation if fails to heal.
Visited at bedside. Pt limited to clear liquids yesterday and so far this AM.
Diet may be advanced later today. Discussed nutrition importance and increased
protein needs. Educated on all food sources of high quality protein. Presented
oral nutrition supplement options. Pt declined all supplements, even
Beneprotein modular, prefering to focus on real food sources. Recommended goal
of 2 per meal. Otherwise, no other nutrition concerns. Pt very accepting of
nutrition information. Low nutrition risk.

## 2020-01-31 NOTE — NUR
PT AOX4. PT REPORTS 8-9/10 PAIN IN LEFT FOOT. PT RECEIVING PRN IV MORPHINE
Q4HR AND PRN PO OXYCODONE. PT DENIES SOA ON ROOM AIR. PT ABLE TO REPOSITION
INDEPENDENTLY. PT TOLERATING CLEAR LIQUID PO INTAKE WITHOUT ISSUE. PT RESTING
IN BED THROUGHOUT SHIFT. ENCOURAGED TO NOTIFY STAFF FOR ALL NEEDS. CALL LIGHT
WITHIN REACH, BED ALARM ON, BED IN LOWEST POSITION.

## 2020-01-31 NOTE — NUR
Assumed pt care this am, wound vac in place and draining sanguinous fluids. Pt
requires pain meds on the dot and no relief is notes for long periods.
Maintained on clear liquids, blood sugar checks done with insulin as per emar
given. VS stable, Isolation maintained.
 
POC followed, transferred to  447, endorsed to the night nurse.

## 2020-02-01 VITALS — SYSTOLIC BLOOD PRESSURE: 135 MMHG | DIASTOLIC BLOOD PRESSURE: 75 MMHG

## 2020-02-01 VITALS — SYSTOLIC BLOOD PRESSURE: 162 MMHG | DIASTOLIC BLOOD PRESSURE: 87 MMHG

## 2020-02-01 VITALS — DIASTOLIC BLOOD PRESSURE: 82 MMHG | SYSTOLIC BLOOD PRESSURE: 151 MMHG

## 2020-02-01 VITALS — SYSTOLIC BLOOD PRESSURE: 137 MMHG | DIASTOLIC BLOOD PRESSURE: 80 MMHG

## 2020-02-01 LAB
ANION GAP SERPL CALC-SCNC: 7 MMOL/L (ref 7–16)
BUN SERPL-MCNC: 30 MG/DL (ref 7–18)
CALCIUM SERPL-MCNC: 8.6 MG/DL (ref 8.5–10.1)
CHLORIDE SERPL-SCNC: 105 MMOL/L (ref 98–107)
CO2 SERPL-SCNC: 25 MMOL/L (ref 21–32)
CREAT SERPL-MCNC: 1 MG/DL (ref 0.7–1.3)
ERYTHROCYTE [DISTWIDTH] IN BLOOD BY AUTOMATED COUNT: 14.6 % (ref 10.5–14.5)
GLUCOSE SERPL-MCNC: 199 MG/DL (ref 74–106)
HCT VFR BLD CALC: 33 % (ref 42–52)
HGB BLD-MCNC: 10.5 GM/DL (ref 14–18)
MCH RBC QN AUTO: 26.9 PG (ref 26–34)
MCHC RBC AUTO-ENTMCNC: 31.8 G/DL (ref 28–37)
MCV RBC: 84.6 FL (ref 80–100)
PLATELET # BLD: 383 THOU/UL (ref 150–400)
POTASSIUM SERPL-SCNC: 3.9 MMOL/L (ref 3.5–5.1)
RBC # BLD AUTO: 3.9 MIL/UL (ref 4.5–6)
SODIUM SERPL-SCNC: 137 MMOL/L (ref 136–145)
WBC # BLD AUTO: 6.6 THOU/UL (ref 4–11)

## 2020-02-01 NOTE — NUR
ASSESSMENT COMPLETED.PT SIITING IN THE CHAIR. WOUND VAC IN PLACE AND WORKING
FINE. AFEBRILE.DENIES NAUSEA. PT LEVEL OF PAIN IS 7. PT WANTS TO WAIT AND GET
MOPHINE IN 45 MINS.

## 2020-02-01 NOTE — NUR
1/31/20 1930
ASSUMED CARE OF PT AFTER BEDSIDE REPORT. PT RESTING IN BED STATES PAIN TO
LEFT FOOT 8/10. 2045 ASSESSMENT COMPLETED, SEE ASSESSMENT.  PT WITH COMPLAINTS
OF PAIN TO L FOOT, WOUND VAC IN PLACE AND SUCTION PRESENT. PICC LINE FLUSHED
WELL AFTER BLOOD RETURN, IV ABX INFUSING AS ORDERED.  PAIN MEDS GIVEN AS PER
DR ORDER PRN FOR PAIN, WILL CONTINUE WITH HOURLY ROUNDING.  PT CHANGES
POSITIONS ON HIS OWN AND USES A BUCKET FOR URINATION.

## 2020-02-02 VITALS — SYSTOLIC BLOOD PRESSURE: 151 MMHG | DIASTOLIC BLOOD PRESSURE: 80 MMHG

## 2020-02-02 VITALS — DIASTOLIC BLOOD PRESSURE: 83 MMHG | SYSTOLIC BLOOD PRESSURE: 148 MMHG

## 2020-02-02 VITALS — SYSTOLIC BLOOD PRESSURE: 139 MMHG | DIASTOLIC BLOOD PRESSURE: 90 MMHG

## 2020-02-02 VITALS — SYSTOLIC BLOOD PRESSURE: 145 MMHG | DIASTOLIC BLOOD PRESSURE: 86 MMHG

## 2020-02-02 NOTE — PATH
[facility]
[address]
[place]   [address]                     PATHOLOGY RPT PROCEDURE       
_______________________________________________________________________________
 
Name:       JO ANN ESPOSITO                Room #:         438-P       ADM IN  
M.R.#:      [id number]     Account #:      [id number]  
Admission:  01/28/20    Date of Birth:  [birth year]  
Discharge:                                              Report #:    [id number]
                                                        Path Case #: [id number]
_______________________________________________________________________________
 
[facility] Accession Number: [id number]
.                                                                01
Material submitted:                                        .
toe - LEFT THRID METATARSAL. Modifiers: left, third
.                                                                01
Clinical history:                                          .
Infected left foot with osteomyelitis.
.                                                                02
**********************************************************************
Diagnosis:
Third left metatarsal, debridement:
- Bone with marked acute osteomyelitis as well as osteonecrosis.
(IUV/db; 1/31/2020)
LBQ  01/31/2020  1542 Local
**********************************************************************
.                                                                02
Electronically signed:                                     .
Margie Hicks MD, Pathologist
NPI- [id number]
.                                                                01
Gross description:                                         .
Received in formalin labeled "Jo Ann Esposito, left third metatarsal" are
two irregular portions of tan-white bone measuring 1.7 x 1.7 x 1.4 cm and
3.0 x 2.4 x 1.4 cm.  Multiple surgical resection margins are identified.
The specimen is not inked due to its fragmented nature.  Representative
sections of both pieces are submitted in cassettes A1-A2 following
decalcification. ([facility] [place] [place]; 1/30/2020)
[facility]  01/30/2020  1724 Local
.                                                                02
Pathologist provided ICD-10:
M86.172, M87.9
.                                                                02
CPT                                                        .
730808, 507260
Specimen Comment: A courtesy copy of this report has been sent to [phone]
[phone]
Specimen Comment: [id number], [phone]
Specimen Comment: Report sent to ,DR STEVENS / DR FERMIN
***Performed at:  01
   [address], [place], [place]  [id number]
   MD Gilberto Bowman MD Phone:  [phone]
***Performed at:  02
   [address]  [id number]
   MD Margie Hicks MD Phone:  [phone]

## 2020-02-03 VITALS — DIASTOLIC BLOOD PRESSURE: 83 MMHG | SYSTOLIC BLOOD PRESSURE: 154 MMHG

## 2020-02-03 VITALS — DIASTOLIC BLOOD PRESSURE: 72 MMHG | SYSTOLIC BLOOD PRESSURE: 136 MMHG

## 2020-02-03 VITALS — DIASTOLIC BLOOD PRESSURE: 81 MMHG | SYSTOLIC BLOOD PRESSURE: 144 MMHG

## 2020-02-03 VITALS — SYSTOLIC BLOOD PRESSURE: 160 MMHG | DIASTOLIC BLOOD PRESSURE: 49 MMHG

## 2020-02-03 LAB
ANION GAP SERPL CALC-SCNC: 8 MMOL/L (ref 7–16)
BUN SERPL-MCNC: 20 MG/DL (ref 7–18)
CALCIUM SERPL-MCNC: 8.9 MG/DL (ref 8.5–10.1)
CHLORIDE SERPL-SCNC: 104 MMOL/L (ref 98–107)
CO2 SERPL-SCNC: 26 MMOL/L (ref 21–32)
CREAT SERPL-MCNC: 0.9 MG/DL (ref 0.7–1.3)
ERYTHROCYTE [DISTWIDTH] IN BLOOD BY AUTOMATED COUNT: 14.3 % (ref 10.5–14.5)
GLUCOSE SERPL-MCNC: 175 MG/DL (ref 74–106)
HCT VFR BLD CALC: 35.2 % (ref 42–52)
HGB BLD-MCNC: 11.2 GM/DL (ref 14–18)
MCH RBC QN AUTO: 26.8 PG (ref 26–34)
MCHC RBC AUTO-ENTMCNC: 31.8 G/DL (ref 28–37)
MCV RBC: 84.1 FL (ref 80–100)
PLATELET # BLD: 414 THOU/UL (ref 150–400)
POTASSIUM SERPL-SCNC: 4.1 MMOL/L (ref 3.5–5.1)
RBC # BLD AUTO: 4.18 MIL/UL (ref 4.5–6)
SODIUM SERPL-SCNC: 138 MMOL/L (ref 136–145)
WBC # BLD AUTO: 6.8 THOU/UL (ref 4–11)

## 2020-02-03 NOTE — NUR
CARE TEAM INDICATED THAT PT IS PROGRESSING TOWARD GOAL OF DISCHRGE. IT WAS
INDICATED THAT PT WILL NEED HOME INFUSION, HOME HEALTH, AND A HOME WOUND VAC
UPON DC. CM MET WITH PT AT BEDSIDE AND HE IS AWARE AND AGREEABLE WITH THESE
SERVICES. PT HAD USED [facility]  IN THE PAST [facility] WELL [facility] AND INDICATED THAT REFERRALS COULD BE SENT TO BOTH. DC PLANNER
FAXED  CM FAXED TO [facility]. [facility] WITH WOUND CARE ORDERED VAC FOR HOME.
CM AWAITING FINAL RECS FROM ID. CM TO FOLLOW AS INDICATED WITH DC PLANNING.

## 2020-02-03 NOTE — NUR
ASSUMED PT CARE AT 1900. PT REPORTS PAIN 10/10, MORPHINE PROVIDING RELIEF.
ANTIBIOTICS AND FLUIDS INFUSING PER ORDER. ISOLATION D/C TONIGHT. WOUND VAC
DRAINING PROPERLY. PICC LINE FLUSHED WITH GOOD BLOOD RETURN. PT IN CHAIR
WATCHING TV ALL EVENING.

## 2020-02-03 NOTE — NUR
WOUND CONSULT;
THE WOUND BED LOOKS BEEFY RED WITH SOME CAPPILARY BLEEDING.  THETHIS IS A
SURGICAL WOUND APPROX 6 X 2 X 3CM. NO S/S OF INFECTION.  THIS WOUND IS READY
FOR A WOUND VAC.
 
WOUND VAC TO BE APPLIED.  PT EDUCATED.
 
DISCUSSED WITH THUAN

## 2020-02-04 VITALS — SYSTOLIC BLOOD PRESSURE: 144 MMHG | DIASTOLIC BLOOD PRESSURE: 83 MMHG

## 2020-02-04 VITALS — SYSTOLIC BLOOD PRESSURE: 140 MMHG | DIASTOLIC BLOOD PRESSURE: 79 MMHG

## 2020-02-04 VITALS — DIASTOLIC BLOOD PRESSURE: 83 MMHG | SYSTOLIC BLOOD PRESSURE: 144 MMHG

## 2020-02-04 NOTE — NUR
PT TO DISCHARGE HOME THIS DAY. PT TO HAVE [facility] HOME [facility], [facility], AND HAS HOME [facility] WOUND VAC. ORDERS HAVE BEEN SENT TO ALL SERVICE
PROVIDERS. PT WAS ISSUED A SPECIAL SHOE FOR USE UPON DC. NO OTHER CM
INTERVENTION INDICATED. CASE CLOSED.

## 2020-02-04 NOTE — NUR
PT DISCHARGING TODAY TO HOME WITH [facility] THEY RECEIVED DC ORDERS AND WILL NOTIFY PT TIME
OF VISITS.
FAXED DC ORDERS/SUMMARY TO [facility] CARE FOR IV ABX INFUSION SPOKE WITH UZMA
AND SHE RECEIVED DC ORDERS.

## 2020-02-04 NOTE — NUR
Assumed care of pt at 0700. Pt a&ox4. Wound vac in place. Prn pain meds
administered per pt request. Home health nurse taught pt about home infusions.
Will d/c with picc line. Home wound vac delivered in the hospital. Hospital
shoe delivered as well. Pt will discharge to home.

## 2020-02-04 NOTE — NUR
PT AOX4. PT REPORTS 8-10/10 PAIN IN LEFT FOOT. PT RECEIVING PRN PO OXYCODONE
Q4HR AND PRN IV MORPHINE Q4HR. PT REPORTS PAIN IN WORSE WITH TACTILE
STIMULATION AND DRESSING CHANGES. WOUND VAC IN PLACE, SEROSANGUINEOUS DRAINAGE
NOTED, CONTINUES TO FUNCTION WITHOUT ISSUE. PT AMBULATING INDEPENDENTLY, USING
BASIN FOR URINAL. PT TOLERATING PO INTAKE WITHOUT ISSUE. PT ANTICIPATING
DISCHARGE SOON. ENCOURAGED PT TO NOTIFY STAFF FOR ALL NEEDS. CALL LIGHT WITHIN
REACH, BED ALARM ON, BED IN LOWEST POSITION. WILL CONTINUE TO MONITOR.

## 2020-02-12 ENCOUNTER — HOSPITAL ENCOUNTER (OUTPATIENT)
Dept: HOSPITAL 35 - HYPER | Age: 50
End: 2020-02-12
Attending: PREVENTIVE MEDICINE
Payer: COMMERCIAL

## 2020-02-12 DIAGNOSIS — E11.40: ICD-10-CM

## 2020-02-12 DIAGNOSIS — M86.372: ICD-10-CM

## 2020-02-12 DIAGNOSIS — T81.31XA: Primary | ICD-10-CM

## 2020-02-12 DIAGNOSIS — E11.621: ICD-10-CM

## 2020-02-12 DIAGNOSIS — Z89.422: ICD-10-CM

## 2020-02-12 DIAGNOSIS — Z86.718: ICD-10-CM
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
## 2020-02-12 DIAGNOSIS — R60.0: ICD-10-CM
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
## 2020-02-12 DIAGNOSIS — E44.0: ICD-10-CM

## 2020-02-12 DIAGNOSIS — L97.522: ICD-10-CM

## 2020-02-12 DIAGNOSIS — E11.69: ICD-10-CM

## 2020-02-12 DIAGNOSIS — L84: ICD-10-CM

## 2020-02-12 DIAGNOSIS — Z79.84: ICD-10-CM

## 2020-02-21 ENCOUNTER — HOSPITAL ENCOUNTER (INPATIENT)
Dept: HOSPITAL 35 - ER | Age: 50
LOS: 4 days | Discharge: HOME HEALTH | DRG: 314 | End: 2020-02-25
Attending: INTERNAL MEDICINE | Admitting: INTERNAL MEDICINE
Payer: COMMERCIAL

## 2020-02-21 VITALS — DIASTOLIC BLOOD PRESSURE: 91 MMHG | SYSTOLIC BLOOD PRESSURE: 131 MMHG

## 2020-02-21 VITALS — WEIGHT: 315 LBS | BODY MASS INDEX: 41.75 KG/M2 | HEIGHT: 72.99 IN

## 2020-02-21 VITALS — DIASTOLIC BLOOD PRESSURE: 86 MMHG | SYSTOLIC BLOOD PRESSURE: 128 MMHG

## 2020-02-21 VITALS — SYSTOLIC BLOOD PRESSURE: 140 MMHG | DIASTOLIC BLOOD PRESSURE: 94 MMHG

## 2020-02-21 VITALS — SYSTOLIC BLOOD PRESSURE: 123 MMHG | DIASTOLIC BLOOD PRESSURE: 90 MMHG

## 2020-02-21 VITALS — SYSTOLIC BLOOD PRESSURE: 141 MMHG | DIASTOLIC BLOOD PRESSURE: 111 MMHG

## 2020-02-21 DIAGNOSIS — I13.0: ICD-10-CM

## 2020-02-21 DIAGNOSIS — Z86.711: ICD-10-CM

## 2020-02-21 DIAGNOSIS — J96.01: ICD-10-CM

## 2020-02-21 DIAGNOSIS — E11.69: ICD-10-CM

## 2020-02-21 DIAGNOSIS — I50.30: ICD-10-CM

## 2020-02-21 DIAGNOSIS — E66.2: ICD-10-CM

## 2020-02-21 DIAGNOSIS — Z79.01: ICD-10-CM

## 2020-02-21 DIAGNOSIS — Z79.84: ICD-10-CM

## 2020-02-21 DIAGNOSIS — Z86.718: ICD-10-CM

## 2020-02-21 DIAGNOSIS — Z89.422: ICD-10-CM

## 2020-02-21 DIAGNOSIS — I27.20: Primary | ICD-10-CM

## 2020-02-21 DIAGNOSIS — Z79.899: ICD-10-CM

## 2020-02-21 DIAGNOSIS — E11.22: ICD-10-CM

## 2020-02-21 DIAGNOSIS — Z79.891: ICD-10-CM

## 2020-02-21 DIAGNOSIS — I27.81: ICD-10-CM

## 2020-02-21 DIAGNOSIS — M86.672: ICD-10-CM

## 2020-02-21 DIAGNOSIS — N18.9: ICD-10-CM

## 2020-02-21 LAB
ALBUMIN SERPL-MCNC: 3.8 G/DL (ref 3.4–5)
ALT SERPL-CCNC: 9 U/L (ref 30–65)
ANION GAP SERPL CALC-SCNC: 15 MMOL/L (ref 7–16)
AST SERPL-CCNC: 13 U/L (ref 15–37)
BASOPHILS NFR BLD AUTO: 1.3 % (ref 0–2)
BE(VIVO): -8.4 MMOL/L
BILIRUB SERPL-MCNC: 0.5 MG/DL
BUN SERPL-MCNC: 49 MG/DL (ref 7–18)
CALCIUM SERPL-MCNC: 9.8 MG/DL (ref 8.5–10.1)
CHLORIDE SERPL-SCNC: 101 MMOL/L (ref 98–107)
CO2 SERPL-SCNC: 21 MMOL/L (ref 21–32)
CREAT SERPL-MCNC: 1.7 MG/DL (ref 0.7–1.3)
EOSINOPHIL NFR BLD: 2.4 % (ref 0–3)
ERYTHROCYTE [DISTWIDTH] IN BLOOD BY AUTOMATED COUNT: 15.6 % (ref 10.5–14.5)
GLUCOSE SERPL-MCNC: 258 MG/DL (ref 74–106)
GRANULOCYTES NFR BLD MANUAL: 58.6 % (ref 36–66)
HCO3 BLD-SCNC: 14.7 MMOL/L (ref 22–26)
HCT VFR BLD CALC: 45.2 % (ref 42–52)
HGB BLD-MCNC: 14.5 GM/DL (ref 14–18)
LYMPHOCYTES NFR BLD AUTO: 29.7 % (ref 24–44)
MCH RBC QN AUTO: 27.1 PG (ref 26–34)
MCHC RBC AUTO-ENTMCNC: 32.1 G/DL (ref 28–37)
MCV RBC: 84.4 FL (ref 80–100)
MONOCYTES NFR BLD: 8 % (ref 1–8)
NEUTROPHILS # BLD: 4.2 THOU/UL (ref 1.4–8.2)
PCO2 BLD: 25.1 MMHG (ref 35–45)
PLATELET # BLD: 104 THOU/UL (ref 150–400)
PO2 BLD: 79.2 MMHG (ref 80–100)
POTASSIUM SERPL-SCNC: 5 MMOL/L (ref 3.5–5.1)
PROT SERPL-MCNC: 9.6 G/DL (ref 6.4–8.2)
RBC # BLD AUTO: 5.35 MIL/UL (ref 4.5–6)
SODIUM SERPL-SCNC: 137 MMOL/L (ref 136–145)
TROPONIN I SERPL-MCNC: 0.29 NG/ML (ref ?–0.06)
WBC # BLD AUTO: 7.2 THOU/UL (ref 4–11)

## 2020-02-21 PROCEDURE — 10879: CPT

## 2020-02-21 NOTE — NUR
PATIENT ADMITTED TO ROOM. HE IS ON NON BREATHER. OXYGEN %. NO COMPLAIN
OF PAIN NOTED. WOUND VAC TO TOES. WILL CONT WITH PLAN OF CARE.

## 2020-02-21 NOTE — EKG
[facility]
Elida [place]   [address]                     ELECTROCARDIOGRAM REPORT      
_______________________________________________________________________________
 
Name:       JO ANN ESPOSITO                Room #:         170-8       ADM IN  
M.R.#:      [id number]                       Account #:      [id number]  
Admission:  20    Attend Phys:    Jagjit Dillon
Discharge:              Date of Birth:  [birth year]  
                                                          Report #: [id number]
                                                                    [id number]
_______________________________________________________________________________
THIS REPORT FOR:  
 
cc:  Mariano Vega James A. DO Lundgren,Quoc RAMIRES MD [facility]                                        ~
THIS REPORT FOR:   //name//                          
 
                         [facility] ED
                                       
Test Date:    2020               Test Time:    14:24:15
Pat Name:     JO ANN ESPOSITO           Department:   
Patient ID:   [id number]            Room:         170
Gender:       M                        Technician:   AVA
:          1970               Requested By: Juany Escalera
Order Number: [id number] MD:   Quoc Morelos
                                 Measurements
Intervals                              Axis          
Rate:         114                      P:            72
AR:           172                      QRS:          28
QRSD:         100                      T:            -41
QT:           357                                    
QTc:          492                                    
                           Interpretive Statements
Sinus tachycardia
Poor R wave progression
Nonspecific ST and T wave abnormality
Borderline prolonged QT interval
No previous ECG available for comparison
 
Electronically Signed On 2020 16:24:44 CST by Quoc Morelos
[email or web address]
 
 
 
 
 
 
 
 
 
 
 
 
 
  <ELECTRONICALLY SIGNED>
   By: Quoc Morelos MD, [facility]   
  20     1624
D: 20 1424                           _____________________________________
T: 20 1424                           Quoc Morelos MD, [facility]     /EPI

## 2020-02-22 VITALS — SYSTOLIC BLOOD PRESSURE: 99 MMHG | DIASTOLIC BLOOD PRESSURE: 46 MMHG

## 2020-02-22 VITALS — SYSTOLIC BLOOD PRESSURE: 95 MMHG | DIASTOLIC BLOOD PRESSURE: 66 MMHG

## 2020-02-22 VITALS — DIASTOLIC BLOOD PRESSURE: 61 MMHG | SYSTOLIC BLOOD PRESSURE: 93 MMHG

## 2020-02-22 VITALS — DIASTOLIC BLOOD PRESSURE: 62 MMHG | SYSTOLIC BLOOD PRESSURE: 99 MMHG

## 2020-02-22 VITALS — DIASTOLIC BLOOD PRESSURE: 60 MMHG | SYSTOLIC BLOOD PRESSURE: 102 MMHG

## 2020-02-22 VITALS — DIASTOLIC BLOOD PRESSURE: 65 MMHG | SYSTOLIC BLOOD PRESSURE: 101 MMHG

## 2020-02-22 LAB
ANION GAP SERPL CALC-SCNC: 14 MMOL/L (ref 7–16)
BUN SERPL-MCNC: 56 MG/DL (ref 7–18)
CALCIUM SERPL-MCNC: 9.1 MG/DL (ref 8.5–10.1)
CHLORIDE SERPL-SCNC: 101 MMOL/L (ref 98–107)
CO2 SERPL-SCNC: 22 MMOL/L (ref 21–32)
CREAT SERPL-MCNC: 1.9 MG/DL (ref 0.7–1.3)
GLUCOSE SERPL-MCNC: 243 MG/DL (ref 74–106)
POTASSIUM SERPL-SCNC: 4.4 MMOL/L (ref 3.5–5.1)
SODIUM SERPL-SCNC: 137 MMOL/L (ref 136–145)
TROPONIN I SERPL-MCNC: 0.48 NG/ML (ref ?–0.06)

## 2020-02-22 NOTE — HC
[facility]
Elida [place]   [address]                     CONSULTATION                  
_______________________________________________________________________________
 
Name:       JO ANN ESPOSITO                Room #:         360-P       ADM IN  
M.R.#:      [id number]                       Account #:      [id number]  
Admission:  02/21/20    Attend Phys:    Jagjit Dillon
Discharge:              Date of Birth:  [birth year]  
                                                          Report #: [id number]
                                                                    [id number]   
_______________________________________________________________________________
THIS REPORT FOR:  
 
cc:  Mariano Vega James A. DO Barry, Joseph W. MD                                           ~
CC: Mariano Liu
 
DATE OF SERVICE:  02/22/2020
 
 
INFECTIOUS DISEASE CONSULTATION
 
ATTENDING PHYSICIAN:  Jagjit Dillon MD
 
REASON FOR EVALUATION:  Ongoing treatment for chronic osteomyelitis, left foot
involving the third metatarsal site post excision 01/30, undergoing treatment
with wound VACs and parenteral antimicrobial therapy, culture with isolation of
Staphylococcus aureus.  He has been on cefazolin for the last roughly 3-4 weeks.
 He was seen by Dr. Chavez in the office last week, was felt to be doing okay.  He
had sudden onset of chest discomfort, difficulty breathing.  This progressed
over the course a day or 2 prior to admission.  He was admitted, was found to
have an elevated proBNP to 8900.  Chest CT excluded a PE.  He is on supplemental
oxygen per nasal cannula.  He notes his breathing is primarily noticed,
clinically deteriorates with any sort of activity at this point.  Denies any
particular evidence of fevers.  Appetite has been fair.  No gastrointestinal
related complaints.
 
ALLERGIES:  None known.
 
CURRENT MEDICATIONS:  Include furosemide, duloxetine, diltiazem, enoxaparin,
gabapentin, oxycodone, ondansetron p.r.n.
 
PAST MEDICAL HISTORY:  Diabetes mellitus diagnosed in 1996.  Previous history of
distal lower extremity surgeries.  History of hypertension, DVT, renal failure.
 
SOCIAL HISTORY:  Nonsmoker, no ethanol, no illicit drug use.
 
FAMILY HISTORY:  Noncontributory.
 
REVIEW OF SYSTEMS:  Otherwise unremarkable.
 
PHYSICAL EXAMINATION:
GENERAL:  Mild-to-moderate distress secondary to respiratory effort, appears
 
 
 
[facility]
[address] [place]   [address]                     CONSULTATION                  
_______________________________________________________________________________
 
Name:       JO ANN ESPOSITO                Room #:         360-P       [facility] [place]  
M.R.#:      [id number]                       Account #:      [id number]  
Admission:  02/21/20    Attend Phys:    Jagjit Dillon
Discharge:              Date of Birth:  [birth year]  
                                                          Report #: [id number]
                                                                    [id number]   
_______________________________________________________________________________
generally well nourished.  He is lucid.
VITAL SIGNS:  Temperature 97.3, pulse 92, respirations 20, blood pressure is
116/79.
SKIN:  Warm, dry, no rashes.
HEENT:  He has got to have Ventimask in place.  Normocephalic.  Extraocular
muscles intact.
NECK:  Supple.
LUNGS:  Few scattered coarse breath sounds.
HEART:  Regular.  Borderline tachycardic.  I do not appreciate murmur.
ABDOMEN:  Obese, somewhat distended, soft.  There are no apparent peritoneal
signs.
EXTREMITIES:  Distal left lower extremity has a wound VAC in place.
GENITOURINARY AND RECTAL:  Deferred.
 
LABORATORY DATA:  Electrolytes:  Sodium 137, potassium 4.4, chloride 101,
bicarbonate is 22, anion gap of 14, BUN and creatinine 56 and 1.9, glucose of
243.  Chest x-ray showed no acute process.  ABGs:  pH of 7.35, pCO2 of 25.1, pO2
of 79.2 on a nonrebreather.  CBC:  White count of 7.2, H and H 14.5 and 45.2,
platelets of 104.
 
ASSESSMENT:  Chronic osteomyelitis involving the left third metatarsal
post-resection completed just in excess of 3 weeks of parenteral therapy.  We
will reinitiate cefazolin.  At this point, I do not think there is any evidence
the foot is not progressing as hoped.  Continue wound VAC as prescribed.  We
will have to adjust his antimicrobial therapy based on his renal insufficiency
at this point.
 
 
 
 
 
 
 
 
 
 
 
 
 
 
 
 
 
 
  <ELECTRONICALLY SIGNED>
   By: Tunde Reddy MD           
  02/22/20     1234
D: 02/22/20 0904                           _____________________________________
T: 02/22/20 1033                           Tunde Reddy MD             /nt

## 2020-02-22 NOTE — NUR
SPOKE WITH PTS YOBANI BY PHONE (KEYANA MCMILLAN).  SHE STATES THAT PT HAD A FULL
CARDIAC WORK UP IN JAN/FEB OF 2019.  HE WAS THEN IN [facility].  THE
CARDIOLOGY GROUP NAME WAS "[facility]."  THE CARDIOLOGISTS
NAME WAS "SH[facility] FORRESTER."  THE PHONE NUMBER IS "[phone]" AND THE FAX
NUMBER IS "[phone]."  KEYANA REPORTS THAT HIS WORKUP WAS "NORMAL" AND DOES
NOT RECALL ANYTHING REGARDING PULMONARY HYPERTENSION OR ANY HEART FAILURE.
"HE HAS A ECHO AND A STRESS TEST."  SHE FURTHER STATED THAT THE STRESS TEST
WAS NORMAL.  WILL PASS THIS INFORMATION TO THE DAY RN.

## 2020-02-22 NOTE — 2DMMODE
[facility]
Elida [place]   [address]                   2 D/M-MODE ECHOCARDIOGRAM     
_______________________________________________________________________________
 
Name:       JO ANN ESPOSITO                Room #:         360-P       ADM IN  
M.R.#:      [id number]                       Account #:      [id number]  
Admission:  20    Attend Phys:    Jagjit Laura Marbillie
Discharge:              Date of Birth:  [birth year]  
                                                          Report #: [id number]
                                                                    [id number]
_______________________________________________________________________________
THIS REPORT FOR:  
 
cc:  Mariano Vega James A. DO Lammoglia, Francisco J. MD                                        
                                                                       ~
 
--------------- APPROVED REPORT --------------
 
 
Study performed:  2020 08:12:44
 
EXAM: Comprehensive 2D, Doppler, and color-flow 
Echocardiogram 
Patient Location: Bedside   
Room #:  360     Status:  on-call
 
     BSA:         2.72
HR: 87 bpm BP:          93/61 mmHg 
Rhythm: NSR    
 
Other Information 
Study Quality: Adequate
Technically limited study due to  morbid 
obesity.
 
Indications
Pulmonary HTN.
Hx: DM, HTN, PE.
 
2D Dimensions
RVDd:  51.11 mm  
IVSd:  11.29 (7-11mm) LVOT Diam:  24.74 (18-24mm) 
LVDd:  46.04 mm  
PWd:  11.19 (7-11mm) Ascending Ao:  38.14 (22-36mm)
LVDs:  33.69 (25-40mm) 
Aortic Root:  37.32 mm 
 
Volumes
Left Atrial Volume (Systole) 
Single Plane 4CH:  41.56 mL Single Plane 2CH:  52.55 mL
    LA ESV Index:  18.00 mL/m2
 
Aortic Valve
AoV Peak Landon.:  0.73 m/s 
AO Peak Gr.:  2.12 mmHg  LVOT Max P.56 mmHg
 
 
[facility]
[address]
[place]  [address]
Phone:  [phone]                    2 D/M-MODE ECHOCARDIOGRAM     
_______________________________________________________________________________
 
Name:            JO ANN ESPOSITO                Room #:        360-P       [facility] [place]
..#:           [id number]          Account #:     [id number]  
Admission:       20         Attend Phys:   Jagjit Feliciano
Discharge:                  Date of Birth: [birth year]  
                         Report #:      [id number]
        [id number]
_______________________________________________________________________________
    LVOT Max V:  0.62 m/s
SCOTT Vmax: 4.12 cm2  
 
Mitral Valve
    E/A Ratio:  0.7
    MV Decel. Time:  334.61 ms
MV E Max Landon.:  0.35 m/s 
MV A Landon.:  0.50 m/s  
MV PHT:  97.04 ms  
IVRT:  100.35 ms  
 
Pulmonary Valve
PV Peak Landon.:  0.60 m/s PV Peak Gr.:  1.45 mmHg
 
Tricuspid Valve
TR Peak Landon.:  2.92 m/s  RAP Estimate:  5.00 mmHg
TR Peak Gr.:  34.03 mmHg 
    PA Pressure:  39.00 mmHg
 
Left Ventricle
The left ventricle is normal size. There is normal LV segmental wall 
motion. There is normal left ventricular wall thickness. Left 
ventricular systolic function is normal. LVEF is 50-55%. Mild 
diastolic dysfunction is present (impaired relaxation 
pattern).
 
Right Ventricle
Right ventricle is moderate to severely dilated. Function is 
difficult to visualized but appears hypokinetic.
 
Atria
The left atrium size is normal. Right atrium is moderately 
dilated.
 
Aortic Valve
The aortic valve is normal in structure. No aortic regurgitation is 
present. There is no aortic valvular stenosis.
 
Mitral Valve
The mitral valve is normal in structure. There is no mitral valve 
regurgitation noted. No evidence of mitral valve stenosis.
 
Tricuspid Valve
The tricuspid valve is normal in structure. Mild to moderate 
tricuspid regurgitation. Estimated PAP is 40mmHg.
 
 
 
[facility]
[address]
[place]  [address]
Phone:  [phone]                    2 D/M-MODE ECHOCARDIOGRAM     
_______________________________________________________________________________
 
Name:            JO ANN ESPOSITO                Room #:        360-P       ADM IN
M.R.#:           [id number]          Account #:     [id number]  
Admission:       20         Attend Phys:   Jagjit Feliciano
Discharge:                  Date of Birth: [birth year]  
                         Report #:      [id number]
        [id number]
_______________________________________________________________________________
Pulmonic Valve
Pulmonic valve is not well visualized. Trace pulmonic 
regurgitation.
 
Great Vessels
The aortic root is normal in size. The ascending aorta is normal in 
size. IVC is normal in size and collapses >50% with 
inspiration.
 
Pericardium
There is no pericardial effusion.
 
<Conclusion>
The left ventricle is normal size.
LVEF is 50-55%.
Right ventricle is moderate to severely dilated. Function is 
difficult to visualized but appears hypokinetic.
Right atrium is moderately dilated.
The aortic valve is normal in structure.
The mitral valve is normal in structure.
The tricuspid valve is normal in structure.
Mild to moderate tricuspid regurgitation. Estimated PAP is 
40mmHg.
Pulmonic valve is not well visualized.
Trace pulmonic regurgitation.
There is no pericardial effusion.
 
 
 
 
 
 
 
 
 
 
 
 
 
 
 
 
 
 
  <ELECTRONICALLY SIGNED>
   By: Jarod Carmona MD    
  20     1055
D: 205                           _____________________________________
T: 20                           Jarod Carmona MD      /INF

## 2020-02-22 NOTE — NUR
PATIENT HAS RESTED IN ROOM THROUGH THE DAY. HE DID COMPLAIN OF PAIN TO LEFT
FOOT AND PRN PAIN MEDS ADMININSTERED. ENCOURAGED TO SIT ON CHAIR OR ATLEAST
WALK SOME BUT HE PREFERRED TO STAY IN THE BED. RESPIRATIONS ARE NON LABORED.
OXYGEN TITRATED DOWN TO 2L AT THIS TIME. HE OXYGEN SATS STILL STAYS ABOVE 98%.
MAY WEAN PATIENT OFF AT BEDTIME. CONT ON IV ABT. NO ADVERSE EFFECTS NOTED.

## 2020-02-22 NOTE — NUR
PT MAKING SLOW PROGRESS TOWARDS GOALS.  X2 DOSES OF PERCOCET GIVEN OVERNIGHT.
SEE CHARTING.  WOUND VACK ON LEFT FOOT.  PT HAS PUMP AND IT APPEARS TO
FUNCTIONING APPROPRIATELY.  7-9/10 PAIN LEFT FOOT.  CHEST DISCOMFORT IS
DESCRIBED AS "HEAVINESS" OR "PRESSURE.  LIKE A BRICK IS ON MY CHEST."
PERSISTENT/CONSTANT STATE OF CHEST DISCOMFORT THAT HE RATED AS 5/10 THROUGHOUT
THE NIGHT.  PT ON VENTURI MASK AT 50%.  HAD BEEN ON NRB MASK, TRANSITIONED TO
O2 6L PER NC TO BE ABLE TO EAT AND USE THE PHONE.  PT REPORTING THAT HE DIDN'T
FEEL HE COULD GET ENOUGH OXYGEN WITH THE NASAL CANNULA AND WAS THUS
TRANSITIONED TO THE VENTURI MASK.  PT REPORTED THAT THIS HELPED HIM BREATHE
EASIER.

## 2020-02-23 VITALS — SYSTOLIC BLOOD PRESSURE: 119 MMHG | DIASTOLIC BLOOD PRESSURE: 93 MMHG

## 2020-02-23 VITALS — SYSTOLIC BLOOD PRESSURE: 131 MMHG | DIASTOLIC BLOOD PRESSURE: 77 MMHG

## 2020-02-23 VITALS — SYSTOLIC BLOOD PRESSURE: 94 MMHG | DIASTOLIC BLOOD PRESSURE: 61 MMHG

## 2020-02-23 VITALS — SYSTOLIC BLOOD PRESSURE: 141 MMHG | DIASTOLIC BLOOD PRESSURE: 55 MMHG

## 2020-02-23 LAB
ALBUMIN SERPL-MCNC: 3.1 G/DL (ref 3.4–5)
ANION GAP SERPL CALC-SCNC: 11 MMOL/L (ref 7–16)
BILIRUB UR-MCNC: NEGATIVE MG/DL
BUN SERPL-MCNC: 75 MG/DL (ref 7–18)
CALCIUM SERPL-MCNC: 8.8 MG/DL (ref 8.5–10.1)
CHLORIDE SERPL-SCNC: 100 MMOL/L (ref 98–107)
CO2 SERPL-SCNC: 24 MMOL/L (ref 21–32)
COLOR UR: YELLOW
CREAT SERPL-MCNC: 2.5 MG/DL (ref 0.7–1.3)
GLUCOSE SERPL-MCNC: 142 MG/DL (ref 74–106)
HYALINE CASTS #/AREA URNS LPF: (no result) /LPF
KETONES UR STRIP-MCNC: NEGATIVE MG/DL
PHOSPHATE SERPL-MCNC: 7.2 MG/DL (ref 2.5–4.9)
POTASSIUM SERPL-SCNC: 4.5 MMOL/L (ref 3.5–5.1)
RBC # UR STRIP: NEGATIVE /UL
SODIUM SERPL-SCNC: 135 MMOL/L (ref 136–145)
SP GR UR STRIP: >= 1.03 (ref 1–1.03)
SQUAMOUS: (no result) /LPF (ref 0–3)
URINE CLARITY: CLEAR
URINE GLUCOSE-RANDOM*: (no result)
URINE LEUKOCYTES-REFLEX: NEGATIVE
URINE NITRITE-REFLEX: NEGATIVE
URINE PROTEIN (DIPSTICK): (no result)
UROBILINOGEN UR STRIP-ACNC: 0.2 E.U./DL (ref 0.2–1)

## 2020-02-23 NOTE — NUR
Patient has had an uneventful day he is weaned to RA. Urine collected for UA.
Picc to JOHANNA flushed easily with good blood return, the SL in the rt ac flushed
easily. Patient has requested IV Morphine every 4 hrs today, I explained to
him that he really should use the PO pain medication first. He stated "oh no I
have to have to have the IV medication I hurt way to bad". He has not done
much today besides lay in bed. Rachael LAROSE changed the drsg to his left foot.

## 2020-02-24 VITALS — DIASTOLIC BLOOD PRESSURE: 86 MMHG | SYSTOLIC BLOOD PRESSURE: 159 MMHG

## 2020-02-24 VITALS — DIASTOLIC BLOOD PRESSURE: 69 MMHG | SYSTOLIC BLOOD PRESSURE: 150 MMHG

## 2020-02-24 VITALS — DIASTOLIC BLOOD PRESSURE: 56 MMHG | SYSTOLIC BLOOD PRESSURE: 119 MMHG

## 2020-02-24 VITALS — SYSTOLIC BLOOD PRESSURE: 149 MMHG | DIASTOLIC BLOOD PRESSURE: 95 MMHG

## 2020-02-24 NOTE — NUR
PT MAKING POOR PROGRESS TOWARDS GOALS.  ENCOURAGED PT TO TAKE PO PAIN
MEDICATIONS BEFORE ASKING FOR IV PAIN MEDICATIONS.  "WELL THE MORPHINE REALLY
TAKES THE STING OUT OF THE PAIN IN MY FOOT RIGHT AWAY.  THE PILLS TAKE AN HOUR
TO WORK."  SEE CHARTING.

## 2020-02-24 NOTE — NUR
pt is A&0X3, PT is continuing IV abx, wound care and pain management, pt is
off o2 and he is on RA, pt denies SOB and n/v .

## 2020-02-24 NOTE — NUR
DISCHARGE PLANNING. PATIENT ADMITTED FROM HOME. PATIENT STATES HE IS
RECEIVING HH SERVICES THROUGH [facility]. CALL PLACED TO [facility] TO VERIFY THAT PATIENT IS ON SERVICE WITH THEM. SPOKE WITH JOANNE,
VERIFIED PATIENT IS ON SERVICE WITH [facility] AND [facility] WILL RESUME HH
SERVICES ONCE DISCHARGE/HH ORDERS RECEIVED. UNIT SW NOTIFIED.

## 2020-02-24 NOTE — NUR
INITIAL ASSESSMENT:
SW reviewed chart and spoke with nursing and attending physician. Pt was
admitted from home due to acute respiratory failure. Pt with osteo of the left
foot. Pt was recently discharge home on 2/4 with  services through
[facility] and Home IV Abx through [facility] and Wound Vac from
[facility]. BANDAR met with pt at bedside. Introduced role of SW. Pt is alert/orientated
x 4. Pt states he lives at home. Pt has a walker to use if needed. Pt reports
he is currently on service with /[facility]. Pt's wound vac is in
pt's room. Pt's PCP is Dr. Mejia. Plan is for pt to return home and resume
prior home services. Discharge planner notified HH. BANDAR notified Viry at
[facility]. BANDAR is following to assist as needed with discharge planning.

## 2020-02-24 NOTE — NUR
Nutrition: Pt with BMI >40 (46.7 kg/m2). RD familiar to this pt, just recently
saw for nutrition ed < 1 mo ago on 1/31/20. At this time RD educated pt on
nutrition importance and protein needs given recent L foot debridement w/
removal of 3rd metatarsal during Jan 2019 admit; continues w/ wound VAC to L
foot per provider notes. Will defer repeat RD visit, as pt seen so recently.
Pt's weight is down from 375# per 1/28 to 355# per 2/23 for a 20# wt loss in 1
mo. This lowers BMI from 49.5 to 46.7 kg/m2. Note pt also on Lasix which could
attribute to some of losses. On a carb controlled diet, averaging % of
all meals thus far. Follow up further at time of LOS assessment.

## 2020-02-25 VITALS — SYSTOLIC BLOOD PRESSURE: 144 MMHG | DIASTOLIC BLOOD PRESSURE: 82 MMHG

## 2020-02-25 VITALS — DIASTOLIC BLOOD PRESSURE: 91 MMHG | SYSTOLIC BLOOD PRESSURE: 133 MMHG

## 2020-02-25 VITALS — SYSTOLIC BLOOD PRESSURE: 142 MMHG | DIASTOLIC BLOOD PRESSURE: 91 MMHG

## 2020-02-25 LAB
ANION GAP SERPL CALC-SCNC: 8 MMOL/L (ref 7–16)
BUN SERPL-MCNC: 55 MG/DL (ref 7–18)
CALCIUM SERPL-MCNC: 9.4 MG/DL (ref 8.5–10.1)
CHLORIDE SERPL-SCNC: 100 MMOL/L (ref 98–107)
CO2 SERPL-SCNC: 27 MMOL/L (ref 21–32)
CREAT SERPL-MCNC: 1.5 MG/DL (ref 0.7–1.3)
GLUCOSE SERPL-MCNC: 190 MG/DL (ref 74–106)
POTASSIUM SERPL-SCNC: 4.6 MMOL/L (ref 3.5–5.1)
SODIUM SERPL-SCNC: 135 MMOL/L (ref 136–145)

## 2020-02-25 NOTE — NUR
PT MAKING POOR PROGRESS TOWARDS GOALS.  PT REPORTING 10/10 PAIN WITH
"STABBING" TYPE OF PAIN IN HIS FOOT.  ASKING FOR MORPHINE THAT HE REPORTS
HELPS RESOLVE THAT STABBING PAIN.  ALSO TAKING THE PERCOCET PER ORDERS.

## 2020-02-25 NOTE — HC
[facility] [place] [place]
Elida Garza
[place]   [address]                     CONSULTATION                  
_______________________________________________________________________________
 
Name:       JO ANN ESPOSITO                Room #:         360-P       ADM IN  
M.R.#:      [id number]                       Account #:      [id number]  
Admission:  02/21/20    Attend Phys:    Jagjit Dillon
Discharge:              Date of Birth:  [birth year]  
                                                          Report #: [id number]
                                                                    [id number]   
_______________________________________________________________________________
THIS REPORT FOR:  
 
cc:  Mariano Vega,César Beard MD                                          ~
CC: Mariano Liu
 
DATE OF SERVICE:  02/22/2020
 
 
REASON FOR CONSULTATION:  The patient admitted for shortness of air with a
history of pulmonary embolism with a history of osteomyelitis of the left foot,
status post surgical amputation, debridement of left third metatarsal.
 
HISTORY OF PRESENT ILLNESS:  The patient is a 50-year-old gentleman, a patient
of Dr. Андрей Liu of [facility], who is well known to the wound care
team.  Gentleman suffers from diabetes mellitus type 2 and has had amputation of
all 5 toes of the left foot.  He suffered osteomyelitis of the left third
metatarsal, underwent surgical debridement and amputation of the left third
metatarsal approximately 01/23 and has been at home on wound VAC therapy.  The
patient was admitted through the Emergency Room with shortness of air.  He has a
history of pulmonary embolism.  Spiral CT in the OR showed no evidence of
pulmonary embolism.
 
PAST MEDICAL HISTORY:  Morbid obesity, acute respiratory failure, history of
non-ST elevated myocardial infarction, history of osteomyelitis of the left
third metatarsal, pulmonary hypertension, morbid obesity.
 
SOCIAL HISTORY:  Nonsmoker, has a girlfriend.
 
MEDICATIONS:  Include cefazolin, oxycodone, Cymbalta, Trulicity, Jardiance,
Cardizem, Neurontin, FlexTouch, Humalog insulin.
 
PAST SURGICAL HISTORY:  Amputation of all 5 toes of the left foot, 01/23,
debridement; removal of the left third metatarsal; tonsillectomy; right ankle
reconstructive surgery.
 
SOCIAL HISTORY:  Nonsmoker, nondrinker.
 
REVIEW OF SYSTEMS:  Shortness of breath.
 
PHYSICAL EXAMINATION:
GENERAL:  Shows morbidly obese, middle-age  male.
 
 
 
[address]   [address]                     CONSULTATION                  
_______________________________________________________________________________
 
Name:       JO ANN ESPOSITO                Room #:         360-P       ADM IN  
M.R.#:      [id number]                       Account #:      [id number]  
Admission:  02/21/20    Attend Phys:    Jagjit Dillon
Discharge:              Date of Birth:  [birth year]  
                                                          Report #: [id number]
                                                                    [id number]   
_______________________________________________________________________________
HEENT:  Mucous membranes moist.  The patient is alert and conversant.
NECK:  Supple.
ABDOMEN:  Soft and obese.
LUNGS:  Respirations unlabored.
EXTREMITIES:  Lower extremity exam shows no wounds of the right foot.  All five
toes are present.  Examination of the left foot shows the patient has had
surgical amputation of all 5 toes and those wounds are healed.  The patient has
a wound VAC in the left foot, which he has been on since Monday, now 6 days, so
I removed it.  On the dorsal aspect of the left foot, there is a 5 cm long x 1.2
cm wide x 0.8 cm deep wound at the site of ray amputation of the left third
metatarsal.  Wound has healthy red granulation tissue and only slight odor. 
There is a deeper wound sulcus extending approximately 0.8 cm in the distal
portion of the wound.  There is no exposed bone or connective tissue.  Sterile
saline dressing was placed.  This will be replaced with a quarter strength
Dakin's packing.
 
IMPRESSION:
1.  Morbid obesity.
2.  Shortness of air.
3.  History of osteomyelitis of the left third metatarsal.
4.  Nonhealing surgical wound of the left foot, status post amputation and
debridement of the third metatarsal.
 
PLAN:  Local wound care with quarter strength Dakin's packing twice daily,
anticipate replacement of wound VAC therapy on Monday, 02/24.  Wound appears
healthy red, well granulating and filling in.  The patient's acute illness does
not appear to be related to his foot itself.  There is no evidence of infection
or sepsis.  Continue cefazolin.  Wound care team will follow.
 
 
 
 
 
 
 
 
 
 
 
 
 
 
 
 
  <ELECTRONICALLY SIGNED>
   By: César Baptiste MD          
  02/25/20     0827
D: 02/22/20 1145                           _____________________________________
T: 02/22/20 1215                           César Baptiste MD            /nt

## 2020-02-25 NOTE — NUR
DISCHARGE NOTE:
SW reviewed chart and spoke with nursing and attending physician. Pt is
medically stable for discharge home today. Pt will resume HH services through
[facility] and home IV infusion services through [facility]. Pt
has home wound vac in place. ID changed IV abx to daptomycin 750mg daily. SW
sent new order to [facility] and confirmed info was received. Pt is covered
at 100%. [facility] liaison met with pt at bedside to provide update. Pt will
have dose this evening prior to discharge. Pt to start home IV infusion
tomorrow. [facility] to arrange for delivery. SW met with pt at bedside to
provide update and discuss discharge. Pt is aware and in agreement with
discharge plan. Pt states that ID physician wants to check labs prior to
discharge. Pt will have transportation home when discharged. Contact info for
HH and Home infusion placed in pt's discharge summary. BANDAR updated pt's nurse.
SW is following to assist as needed with discharge planning.

## 2020-02-25 NOTE — NUR
WOUND CONSULT;
THIS PATIENT IS BEING DISCHARGED TODAY. I REAPPLIED THE HOME VAC.  THE WOUND
IS BEEFY RED WITH NO S/S OF INFECTION.  THE PATIENT IS WELL VERSED IN VAC
THERAPY.  NO FURTHER EDUCATION NECESSARY.
 
HOME HEALTH TO FOLLOW
 
DISCUSSED WITH RN

## 2020-02-28 ENCOUNTER — HOSPITAL ENCOUNTER (INPATIENT)
Dept: HOSPITAL 35 - ER | Age: 50
LOS: 6 days | Discharge: HOME HEALTH | DRG: 189 | End: 2020-03-05
Attending: HOSPITALIST | Admitting: HOSPITALIST
Payer: COMMERCIAL

## 2020-02-28 VITALS
BODY MASS INDEX: 41.75 KG/M2 | WEIGHT: 315 LBS | SYSTOLIC BLOOD PRESSURE: 121 MMHG | DIASTOLIC BLOOD PRESSURE: 86 MMHG | HEIGHT: 72.99 IN

## 2020-02-28 DIAGNOSIS — N18.9: ICD-10-CM

## 2020-02-28 DIAGNOSIS — D69.6: ICD-10-CM

## 2020-02-28 DIAGNOSIS — E66.01: ICD-10-CM

## 2020-02-28 DIAGNOSIS — E11.69: ICD-10-CM

## 2020-02-28 DIAGNOSIS — I07.1: ICD-10-CM

## 2020-02-28 DIAGNOSIS — Z86.711: ICD-10-CM

## 2020-02-28 DIAGNOSIS — T50.8X5A: ICD-10-CM

## 2020-02-28 DIAGNOSIS — B95.61: ICD-10-CM

## 2020-02-28 DIAGNOSIS — J96.01: Primary | ICD-10-CM

## 2020-02-28 DIAGNOSIS — E11.621: ICD-10-CM
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
## 2020-02-28 DIAGNOSIS — M86.8X8: ICD-10-CM
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
## 2020-02-28 DIAGNOSIS — I27.20: ICD-10-CM

## 2020-02-28 DIAGNOSIS — J18.9: ICD-10-CM

## 2020-02-28 DIAGNOSIS — E11.22: ICD-10-CM
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
## 2020-02-28 DIAGNOSIS — L03.116: ICD-10-CM

## 2020-02-28 DIAGNOSIS — N17.9: ICD-10-CM

## 2020-02-28 DIAGNOSIS — T17.590A: ICD-10-CM

## 2020-02-28 DIAGNOSIS — S90.929A: ICD-10-CM
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
## 2020-02-28 DIAGNOSIS — Z86.718: ICD-10-CM

## 2020-02-28 DIAGNOSIS — I50.33: ICD-10-CM

## 2020-02-28 DIAGNOSIS — Z89.612: ICD-10-CM

## 2020-02-28 DIAGNOSIS — I13.0: ICD-10-CM

## 2020-02-28 DIAGNOSIS — Z86.14: ICD-10-CM

## 2020-02-28 DIAGNOSIS — G47.33: ICD-10-CM

## 2020-02-28 LAB
ALBUMIN SERPL-MCNC: 3.6 G/DL (ref 3.4–5)
ALT SERPL-CCNC: 11 U/L (ref 16–63)
ANION GAP SERPL CALC-SCNC: 16 MMOL/L (ref 7–16)
APTT BLD: 31.3 SECONDS (ref 24.5–32.8)
AST SERPL-CCNC: 22 U/L (ref 15–37)
BASOPHILS NFR BLD AUTO: 1.1 % (ref 0–2)
BE(VIVO): -5.8 MMOL/L
BILIRUB SERPL-MCNC: 0.9 MG/DL
BILIRUB UR-MCNC: NEGATIVE MG/DL
BUN SERPL-MCNC: 49 MG/DL (ref 7–18)
CALCIUM SERPL-MCNC: 9.4 MG/DL (ref 8.5–10.1)
CHLORIDE SERPL-SCNC: 99 MMOL/L (ref 98–107)
CO2 SERPL-SCNC: 20 MMOL/L (ref 21–32)
COLOR UR: YELLOW
CREAT SERPL-MCNC: 1.4 MG/DL (ref 0.7–1.3)
D DIMER PPP FEU-MCNC: 2.51 UG/MLFEU (ref 0.19–0.5)
EOSINOPHIL NFR BLD: 2.3 % (ref 0–3)
ERYTHROCYTE [DISTWIDTH] IN BLOOD BY AUTOMATED COUNT: 16.6 % (ref 10.5–14.5)
GLUCOSE SERPL-MCNC: 310 MG/DL (ref 74–106)
GRANULOCYTES NFR BLD MANUAL: 60.7 % (ref 36–66)
HCO3 BLD-SCNC: 17.1 MMOL/L (ref 22–26)
HCT VFR BLD CALC: 48.4 % (ref 42–52)
HGB BLD-MCNC: 15.7 GM/DL (ref 14–18)
INR PPP: 1.1
KETONES UR STRIP-MCNC: (no result) MG/DL
LYMPHOCYTES NFR BLD AUTO: 27.1 % (ref 24–44)
MAGNESIUM SERPL-MCNC: 2 MG/DL (ref 1.8–2.4)
MCH RBC QN AUTO: 27.4 PG (ref 26–34)
MCHC RBC AUTO-ENTMCNC: 32.5 G/DL (ref 28–37)
MCV RBC: 84.2 FL (ref 80–100)
MONOCYTES NFR BLD: 8.8 % (ref 1–8)
NEUTROPHILS # BLD: 4.5 THOU/UL (ref 1.4–8.2)
OPIATES UR-MCNC: POSITIVE NG/ML
PCO2 BLD: 27.6 MMHG (ref 35–45)
PLATELET # BLD: 96 THOU/UL (ref 150–400)
PO2 BLD: 72.6 MMHG (ref 80–100)
POTASSIUM SERPL-SCNC: 5.2 MMOL/L (ref 3.5–5.1)
PROT SERPL-MCNC: 8.8 G/DL (ref 6.4–8.2)
PROTHROMBIN TIME: 11.4 SECONDS (ref 9.3–11.4)
RBC # BLD AUTO: 5.74 MIL/UL (ref 4.5–6)
RBC # UR STRIP: (no result) /UL
SODIUM SERPL-SCNC: 135 MMOL/L (ref 136–145)
SP GR UR STRIP: 1.02 (ref 1–1.03)
TROPONIN I SERPL-MCNC: 0.14 NG/ML (ref ?–0.06)
URINE CLARITY: CLEAR
URINE GLUCOSE-RANDOM*: (no result)
URINE LEUKOCYTES-REFLEX: NEGATIVE
URINE NITRITE-REFLEX: NEGATIVE
URINE PROTEIN (DIPSTICK): (no result)
UROBILINOGEN UR STRIP-ACNC: 0.2 E.U./DL (ref 0.2–1)
WBC # BLD AUTO: 7.4 THOU/UL (ref 4–11)

## 2020-02-28 PROCEDURE — 62900: CPT

## 2020-02-28 PROCEDURE — 50010 RENAL EXPLORATION: CPT

## 2020-02-28 PROCEDURE — 62110: CPT

## 2020-02-28 PROCEDURE — 70005: CPT

## 2020-02-28 PROCEDURE — 10081 I&D PILONIDAL CYST COMP: CPT

## 2020-02-28 NOTE — HC
[facility]
Elida Garza
[place]   [address]                     CONSULTATION                  
_______________________________________________________________________________
 
Name:       JO ANN ESPOSITO                Room #:         206-P       ADM IN  
M.R.#:      [id number]                       Account #:      [id number]  
Admission:  02/28/20    Attend Phys:    Valentín Lin MD      
Discharge:              Date of Birth:  [birth year]  
                                                          Report #: [id number]
                                                                    [id number]   
_______________________________________________________________________________
THIS REPORT FOR:  
 
cc:  Mariano Vega James A. DO Fried, John S. MD                                             ~
CC: Mariano Lin
 
DATE OF SERVICE:  03/01/2020
 
 
CONSULTATION:  Infectious Diseases.
 
HISTORY OF PRESENT ILLNESS:  The patient is a 50-year-old white male who was
just discharged from [facility] on 02/25.  He had been
hospitalized for 4 days for heart failure and respiratory failure.  The patient
is dealing with a diabetic foot wound with osteomyelitis.  On his left foot, he
had a previous transmetatarsal amputation.  He developed another plantar wound
with osteomyelitis of the third metatarsal.  Dr. Mccarty was able to successfully
remove the infected third metatarsal on 01/28 and the patient is using a
negative pressure wound dressing to close the wound.  Culture from that
procedure grew methicillin-sensitive Staph aureus.  The patient was initially
treated with cefazolin.  He was having some problems which thought might be
related to the medication, so he was changed to daptomycin.  The patient is now
33 days after resection of the bone.  He was home on the daptomycin.
 
The patient states he was doing in his usual condition at home.  He was taking
it easy.  He was watching the salt in his diet.  He is keeping his foot elevated
and offloaded.  He was taking his medication on a regular basis.  He says while
in the chair doing really nothing, he developed sudden chest pain and shortness
of breath.  He felt that he could not get enough air.  The patient lives alone
and was brought to the hospital.  He has a history of pulmonary emboli and this
was suspected.  However, CT angiogram did not show any new clots.  There were
diffuse infiltrates, particularly perihilar which is thought to possibly
represent atypical pneumonia.  In this setting, an Infectious Disease
consultation was requested.
 
The patient really has no pneumonia symptoms, except for the shortness of
breath.  He says he had no fevers.  He may have had some chills or sweats with
the dyspnea.  The chest pain sounds atypical.  It could be reproduced with
pushing on the sternum or taking deep breaths.  The patient was not having any
sputum production.  He was not having any generalized malaise until he had the
sudden onset of symptoms, which led to his coming to the hospital.
 
PAST MEDICAL HISTORY:  The patient has a past history of diabetes with
hypertension and hyperlipidemia.  He has pulmonary hypertension, probably from
 
 
 
[facility]
[address]
[place]   [address]                     CONSULTATION                  
_______________________________________________________________________________
 
Name:       JO ANN ESPOSITO                Room #:         206-P       ADM IN  
M.R.#:      [id number]                       Account #:      [id number]  
Admission:  02/28/20    Attend Phys:    Valentín Lin MD      
Discharge:              Date of Birth:  [birth year]  
                                                          Report #: [id number]
                                                                    [id number]   
_______________________________________________________________________________
the pulmonary emboli.  He was diagnosed with diastolic dysfunction with heart
failure with preserved ejection fraction of 50%.  The patient has multiple
surgeries on his left foot including the most recent third metatarsal resection.
 He has known pulmonary artery disease.
 
ALLERGIES:  He has no known drug allergies.
 
FAMILY HISTORY:  Noncontributory.
 
SOCIAL HISTORY:  The patient is , lives by himself.  He is an
 for the [employer].  Does not have any history of
tobacco, alcohol, nor drugs.
 
REVIEW OF SYSTEMS:  At this time, the patient continues complaint of dyspnea. 
He is not complaining of fevers, chills nor sweats.  He has generalized malaise,
anxiety, and weakness.  He denies headache, sinus congestion, sore throat,
trouble swallowing.  No dental issues.  The patient denies any cough.  He had
the chest pain as described, the shortness of breath as described.  He denies
any angina, syncope nor palpitations.  The patient denies nausea, vomiting,
diarrhea, constipation.  No urinary complaints.  He has some pain in his foot,
but this is stable.
 
PHYSICAL EXAMINATION:
GENERAL:  The patient appears comfortable, alert, oriented, pleasant, not in any
distress.
VITAL SIGNS:  Showed the patient has not had any fevers since coming to the
hospital.  Blood pressure 144/90, pulse oximetry is 98%, although he complains
of some dyspnea.  His oxygen saturation when he first came to the ER was 94%.
SKIN:  Shows no rash, lesion or exanthem.  VAC is on the foot and described
below.
ENT:  Negative.
HEART:  Sounds S1, S2.
LUNGS:  Clear.
ABDOMEN:  Belly is morbidly obese, soft, not tender.
EXTREMITIES:  The patient has the amputation of 5 toes behind the metatarsal
heads on the left foot.  There is a VAC sponge on the dorsum, approximately 3 cm
long and 1 cm wide.  The visible skin appears robust and healthy.  The foot is
slightly puffy and slightly tender.  I cannot appreciate any pulses, but the
skin has normal color.  There are no new wounds.  PICC line is present in the
left upper arm and appears unremarkable.  The right foot is intact without any
apparent lesions.
 
LABORATORY DATA:  White count is 6.9 with hemoglobin 12.8, hematocrit 40.5,
platelets 89,000, which is an improvement.  Chemistry shows sodium 134,
potassium 5.2, chloride 99, bicarbonate 20, BUN 55, creatinine has gone from 1.6
to 2.3, sugar 356.  Troponin is elevated at 0.17.  Vancomycin trough level high
 
 
 
[facility]
[address]
[place]   [address]                     CONSULTATION                  
_______________________________________________________________________________
 
Name:       JO ANN ESPOSITO                Room #:         206-P       ADM IN  
M.ADELIA.#:      [id number]                       Account #:      [id number]  
Admission:  02/28/20    Attend Phys:    Valentín Lin MD      
Discharge:              Date of Birth:  [birth year]  
                                                          Report #: [id number]
                                                                    [id number]   
_______________________________________________________________________________
at 21.  Uric acid is high at 10.9.  TSH is normal.  Hemoglobin A1c was measured
last time at 7.6.  BNP this time is quite elevated at 9809.
 
The Radiology Department reports CT angiogram negative for clots, but diffuse
infiltrate.  The plain portable chest x-ray was interpreted as normal with no
infiltrates.  The microbiology laboratory reports blood cultures x 2 are
negative at 12 hours.  Influenza rapid test is negative.  MRSA screen of the
nares is negative.  As noted surgical culture from January had
methicillin-sensitive Staph.  There are some comments in the chart about MRSA,
but review of cultures showed no MRSA has been grown.
 
I believe the patient is suffering from probable pulmonary edema rather than
pneumonia.  He never had any fevers nor chills.  He has no sputum production. 
He describes the symptoms as having a sudden, almost thunderclap onset.  His
biggest complaint is dyspnea.  He is not really coughing up any significant
sputum.  All this would be very unusual for pneumonia.  The atypical pneumonia
is usually very insidious in onset.  This sounds almost like flash pulmonary
edema.  The patient does have the markedly elevated BNP, but does have known
right-sided diastolic heart failure.
 
I believe we do not need to treat the patient broadly with vancomycin and Zosyn
for infection.  The vancomycin is probably a factor in the rising creatinine. 
Zosyn has a significant sodium load in its own right.  I would like to change
antibiotic to Rocephin.  I discussed the patient's adverse reaction to the
cefazolin and he is not sure what exactly the problem was and does not think he
had any major allergy.  I would like to have Андрей Liu followup the patient
for ongoing management of the back and the wound.  It is possible that the wound
is doing well enough, the patient may be able to get by without the VAC.  We
will have Dr. Chavez followup regarding the need for continued antibiotic therapy
for the wound now that we are day 34 post-bone resection.  Possibly, the patient
will be changed to oral antibiotic.  We want to watch the BNP and the chest
x-ray carefully.  I anticipate the patient will probably breath better with
diuresis.  I will ask the nurses to try to obtain a daily weight to assess his
fluid status.  The chart notes his weight on admission of 356, which is
consistent with his weight during his previous hospitalization.
 
 
 
 
 
 
 
 
 
                         
   By:                               
                   
D: 03/01/20 2118                           _____________________________________
T: 03/02/20 0044                           Omkar King MD               /nt

## 2020-02-29 VITALS — SYSTOLIC BLOOD PRESSURE: 109 MMHG | DIASTOLIC BLOOD PRESSURE: 63 MMHG

## 2020-02-29 VITALS — SYSTOLIC BLOOD PRESSURE: 111 MMHG | DIASTOLIC BLOOD PRESSURE: 67 MMHG

## 2020-02-29 VITALS — DIASTOLIC BLOOD PRESSURE: 66 MMHG | SYSTOLIC BLOOD PRESSURE: 120 MMHG

## 2020-02-29 VITALS — SYSTOLIC BLOOD PRESSURE: 137 MMHG | DIASTOLIC BLOOD PRESSURE: 94 MMHG

## 2020-02-29 VITALS — DIASTOLIC BLOOD PRESSURE: 56 MMHG | SYSTOLIC BLOOD PRESSURE: 98 MMHG

## 2020-02-29 LAB
ANION GAP SERPL CALC-SCNC: 15 MMOL/L (ref 7–16)
BUN SERPL-MCNC: 55 MG/DL (ref 7–18)
CALCIUM SERPL-MCNC: 9.4 MG/DL (ref 8.5–10.1)
CHLORIDE SERPL-SCNC: 99 MMOL/L (ref 98–107)
CO2 SERPL-SCNC: 20 MMOL/L (ref 21–32)
CREAT SERPL-MCNC: 1.6 MG/DL (ref 0.7–1.3)
ERYTHROCYTE [DISTWIDTH] IN BLOOD BY AUTOMATED COUNT: 16.4 % (ref 10.5–14.5)
GLUCOSE SERPL-MCNC: 356 MG/DL (ref 74–106)
HCT VFR BLD CALC: 45.7 % (ref 42–52)
HGB BLD-MCNC: 14.9 GM/DL (ref 14–18)
MCH RBC QN AUTO: 27.5 PG (ref 26–34)
MCHC RBC AUTO-ENTMCNC: 32.6 G/DL (ref 28–37)
MCV RBC: 84.4 FL (ref 80–100)
PLATELET # BLD: 77 THOU/UL (ref 150–400)
POTASSIUM SERPL-SCNC: 5.2 MMOL/L (ref 3.5–5.1)
RBC # BLD AUTO: 5.41 MIL/UL (ref 4.5–6)
SODIUM SERPL-SCNC: 134 MMOL/L (ref 136–145)
TROPONIN I SERPL-MCNC: 0.15 NG/ML (ref ?–0.06)
WBC # BLD AUTO: 6.2 THOU/UL (ref 4–11)

## 2020-02-29 NOTE — NUR
ASSUMED CARE OF PT AT SHIFT CHANGE. ASSESSMENTS AS CHARTED. MEDS GIVEN PER
MAR. A&OX4. C/O CHEST PAIN TREATED WITH MORPHINE Q4H WITH PARTIAL RELIEF. PAIN
NEVER BELOW 8/10. VANCO TROUGH SCHEDULED FOR 0330. WILL CONTINUE TO MONITOR
AND FOLLOW POC.

## 2020-02-29 NOTE — NUR
ASSUME CARE 0130. PT/VITALS STABLE. INTERMNITTENT MILD CHEST PAIN INDICATED.
UP AD GONZALEZ. ASSESSMENT AS CHARTED. PROGRESSING MODERATELY WITH POC. SDR/ST ON
MONITOR. NO DISTRESS NOTED. ADEQUATE URINE OUTPUT. PLAN IS TO CONTINUE TO
DIURESE PATIENT AND TREAT PNA. WILL CONTINUE TO MONITOR AND FOLLOW WIHT POC

## 2020-03-01 VITALS — DIASTOLIC BLOOD PRESSURE: 90 MMHG | SYSTOLIC BLOOD PRESSURE: 144 MMHG

## 2020-03-01 VITALS — SYSTOLIC BLOOD PRESSURE: 122 MMHG | DIASTOLIC BLOOD PRESSURE: 69 MMHG

## 2020-03-01 VITALS — DIASTOLIC BLOOD PRESSURE: 66 MMHG | SYSTOLIC BLOOD PRESSURE: 140 MMHG

## 2020-03-01 VITALS — DIASTOLIC BLOOD PRESSURE: 46 MMHG | SYSTOLIC BLOOD PRESSURE: 121 MMHG

## 2020-03-01 VITALS — DIASTOLIC BLOOD PRESSURE: 79 MMHG | SYSTOLIC BLOOD PRESSURE: 126 MMHG

## 2020-03-01 LAB
BASOPHILS NFR BLD AUTO: 1 % (ref 0–2)
EOSINOPHIL NFR BLD: 2.5 % (ref 0–3)
ERYTHROCYTE [DISTWIDTH] IN BLOOD BY AUTOMATED COUNT: 16.6 % (ref 10.5–14.5)
EST. AVERAGE GLUCOSE BLD GHB EST-MCNC: 171 MG/DL
GLYCOHEMOGLOBIN (HGB A1C): 7.6 % (ref 4.8–5.6)
GRANULOCYTES NFR BLD MANUAL: 51.2 % (ref 36–66)
HCT VFR BLD CALC: 40.5 % (ref 42–52)
HGB BLD-MCNC: 12.9 GM/DL (ref 14–18)
LYMPHOCYTES NFR BLD AUTO: 35.6 % (ref 24–44)
MCH RBC QN AUTO: 27 PG (ref 26–34)
MCHC RBC AUTO-ENTMCNC: 31.9 G/DL (ref 28–37)
MCV RBC: 84.7 FL (ref 80–100)
MONOCYTES NFR BLD: 9.7 % (ref 1–8)
NEUTROPHILS # BLD: 3.6 THOU/UL (ref 1.4–8.2)
PLATELET # BLD: 89 THOU/UL (ref 150–400)
PROCALCITONIN SERPL-MCNC: 0.16 NG/ML (ref ?–0.5)
RBC # BLD AUTO: 4.78 MIL/UL (ref 4.5–6)
URIC ACID*: 10.9 MG/DL (ref 2.6–7.2)
WBC # BLD AUTO: 6.9 THOU/UL (ref 4–11)

## 2020-03-01 NOTE — HC
[facility]
Elida Garza
[place]   [address]                     CONSULTATION                  
_______________________________________________________________________________
 
Name:       JO ANN ESPOSITO                Room #:         206-P       ADM IN  
M.R.#:      [id number]                       Account #:      [id number]  
Admission:  02/28/20    Attend Phys:    Valentín Lin MD      
Discharge:              Date of Birth:  [birth year]  
                                                          Report #: [id number]
                                                                    [id number]   
_______________________________________________________________________________
THIS REPORT FOR:  
 
cc:  Mariano Vega,Thaddeus Waters MD                                               ~
CC: Mariano Lin
 
DATE OF SERVICE:  02/29/2020
 
 
CARDIOLOGY CONSULTATION
 
INDICATION:  Chest pain.
 
HISTORY OF PRESENT ILLNESS:  This is a 50-year-old male with a history of
diabetes mellitus, chronic wounds of his left foot status post amputation,
osteomyelitis/MRSA, DVT with bilateral PEs, hypertension, presenting with chest
pain and shortness of breath.  He was recently admitted for shortness of breath,
found to have pulmonary hypertension, thought to be related to his previous
pulmonary emboli.  Echo at that time revealed normal LV systolic function with a
dilated right ventricle, diagnosed with diastolic heart failure.  He reports
having pain in his right lower sternal area, reproducible with coughing and deep
inspiration.  His respiratory status has been borderline.  He denies any fever
or chills.  He is nonambulatory secondary to his recent metatarsal amputation. 
There is no history of orthopnea.
 
PAST MEDICAL HISTORY:  Diabetes mellitus, hypertension, foot ulcer with
osteomyelitis and metatarsal amputations.  DVT with PE, recently diagnosed
pulmonary hypertension and diastolic heart failure.
 
ALLERGIES:  None.
 
MEDICATIONS:  At home include Lasix once a day, antibiotics daptomycin, aspirin
once a day, diltiazem 180, Neurontin, insulin.
 
SOCIAL HISTORY:  Denies tobacco use.
 
FAMILY HISTORY:  Negative for premature CAD.
 
REVIEW OF SYSTEMS:  A full 10-point review of systems performed.  Only the
pertinent positives and negatives are described in the HPI.
 
PHYSICAL EXAMINATION:
VITAL SIGNS:  Blood pressure is 120/60, heart rate is 90 beats per minute.
GENERAL APPEARANCE:  An overweight male in no acute respiratory distress.
 
 
 
[address]   [address]                     CONSULTATION                  
_______________________________________________________________________________
 
Name:       JO ANN ESPOSITO                Room #:         206-P       ADM IN  
M.R.#:      [id number]                       Account #:      [id number]  
Admission:  02/28/20    Attend Phys:    Valentín Lin MD      
Discharge:              Date of Birth:  [birth year]  
                                                          Report #: [id number]
                                                                    [id number]   
_______________________________________________________________________________
HEENT:  Normocephalic, atraumatic, oral mucosa moist.
NECK:  Supple.
LUNGS:  Clear to auscultation.
CARDIAC:  Regular rate and rhythm, S1, S2 positive.
ABDOMEN:  Soft, nontender.
EXTREMITIES:  Left metatarsal amputations, no cyanosis, trace edema.
 
ECG reveals sinus rhythm, poor R-wave progression, T-wave inversions in the
inferior leads.
 
LABORATORY VALUES:  White count 6.2, hemoglobin 14.9, creatinine is 1.6. 
Troponin peak is 0.17.
 
ASSESSMENT AND PLAN:
1.  Chest pain syndrome, the pain is reproducible with palpation as well as deep
inspiration.  Doubt that this is ischemia related.  He does have minimally
elevated troponin levels, which may be related to oxygen supply/demand mismatch.
 He has significant risk factors and we will proceed with noninvasive stress
testing.
2.  Pulmonary hypertension, continue with pulmonary followup.
3.  Diastolic heart failure, continue with Lasix therapy.
4.  Diabetes mellitus, check fingersticks and treat with his regimen.
5.  Hypertension, continue on medications.
6.  History of osteomyelitis/methicillin-resistant Staphylococcus aureus, wound
VAC in place.  Continue with antibiotics.
 
 
 
 
 
 
 
 
 
 
 
 
 
 
 
 
 
 
 
  <ELECTRONICALLY SIGNED>
   By: Thaddeus Caraballo MD               
  03/01/20     0838
D: 02/29/20 1114                           _____________________________________
T: 02/29/20 1138                           Thaddeus Caraballo MD                 /nt

## 2020-03-01 NOTE — NUR
ASSUME CARE 1900.PT/VITALS STABLE. INTERMITTENT CJEST AND LEFT FOOT PAIN
NOTED. PT NEEDS ENCOURAGEMENT TO MOVE IN BED OR GET OUT OF BED. REFUSES
STANDING WEIGHTS. ASSESSMETN AS CHARTED. PROGRESSING MODERATELY WITH POC. PLAN
IS TO CONITNUE TO DIURESE PT, MONITOR RESPIRTORY FUNCTION, AND MANAGE PNA.
WILL CONTINUE TO FOLLOW WITH POC

## 2020-03-01 NOTE — NUR
ASSUMED CARE OF PT AT SHIFT CHANGE. ASSESSMENTS AS CHARTED. MEDS GIVEN PER
MAR. PT A&OX4. C/O CHEST PAIN TREATED WITH IV MEDS WITH PARTIAL RELIEF. LASIX
AND VANC ON HOLD D/T ELEVATED CREATININE. FLUIDS ADDED. CARDOSO IN PLACE AS IS
WOUND VAC ON LEFT FOOT. WILL CONTINUE TO MONITOR AND FOLLOW POC.

## 2020-03-02 VITALS — SYSTOLIC BLOOD PRESSURE: 144 MMHG | DIASTOLIC BLOOD PRESSURE: 85 MMHG

## 2020-03-02 VITALS — DIASTOLIC BLOOD PRESSURE: 81 MMHG | SYSTOLIC BLOOD PRESSURE: 142 MMHG

## 2020-03-02 VITALS — DIASTOLIC BLOOD PRESSURE: 80 MMHG | SYSTOLIC BLOOD PRESSURE: 162 MMHG

## 2020-03-02 VITALS — DIASTOLIC BLOOD PRESSURE: 70 MMHG | SYSTOLIC BLOOD PRESSURE: 138 MMHG

## 2020-03-02 VITALS — SYSTOLIC BLOOD PRESSURE: 101 MMHG | DIASTOLIC BLOOD PRESSURE: 89 MMHG

## 2020-03-02 LAB
ANION GAP SERPL CALC-SCNC: 7 MMOL/L (ref 7–16)
BUN SERPL-MCNC: 65 MG/DL (ref 7–18)
CALCIUM SERPL-MCNC: 8.4 MG/DL (ref 8.5–10.1)
CHLORIDE SERPL-SCNC: 103 MMOL/L (ref 98–107)
CO2 SERPL-SCNC: 26 MMOL/L (ref 21–32)
CREAT SERPL-MCNC: 1.5 MG/DL (ref 0.7–1.3)
GLUCOSE SERPL-MCNC: 188 MG/DL (ref 74–106)
POTASSIUM SERPL-SCNC: 4.2 MMOL/L (ref 3.5–5.1)
SODIUM SERPL-SCNC: 136 MMOL/L (ref 136–145)

## 2020-03-02 NOTE — NUR
Assess due to class III obesity BMI of 44.6.  Has has several admissions and
most recent discharge last week for CHF.  Also has diabetic foot ulcer with
hx metatarsal amputation.  Upon visit, pt voiced good appetite, asking for
alternative menu.  Informed pt of carb control, 1750ml fluid restriction diet
order.  Minimal interest in conversation as pt looking at phone whole time.
A1C 7.5, on ss insulin.  Pulmonary, ID, and wound care consults are ordered.
Usual wt is 350s per pt.  Current wt 338 lb, loss of 12 lb.  Low nutrition
risk at this time.

## 2020-03-02 NOTE — NUR
AAOX4. CALM, COOPERATIVE. MEDICATED FOR NON-CARDIAC CHEST PAIN AND LEFT FOOT
PAIN. DOWN FOR STRESS TEST NOW. BLOOD FOR LAB DRAWN VIA PICC LINE, BRISK BLOOD
RETURN, FLUSHES READILY. SR PER TELE. WILL CONTINUE TO FOLLOW CLOSELY.

## 2020-03-02 NOTE — NUR
ASSUMED PT CARE AROUND 1900. PT WAS RESTING IN BED. PT HAS C/O PAIN BETWEEN
8-10 CONSTANTLY. PAIN MEDICATION GIVEN PER EMAR. PT RESTED THRU NIGHT WITH
MINIMAL INTERRUPTIONS. WOUND VAC STILL ATTACHED TO LEFT FOOT. PT HAD NO C/O OF
SOA OR N/V/D. WILL CONTINUE TO MONITOR PER PLAN OF CARE.

## 2020-03-03 VITALS — DIASTOLIC BLOOD PRESSURE: 76 MMHG | SYSTOLIC BLOOD PRESSURE: 144 MMHG

## 2020-03-03 VITALS — SYSTOLIC BLOOD PRESSURE: 151 MMHG | DIASTOLIC BLOOD PRESSURE: 87 MMHG

## 2020-03-03 VITALS — DIASTOLIC BLOOD PRESSURE: 88 MMHG | SYSTOLIC BLOOD PRESSURE: 145 MMHG

## 2020-03-03 VITALS — SYSTOLIC BLOOD PRESSURE: 152 MMHG | DIASTOLIC BLOOD PRESSURE: 81 MMHG

## 2020-03-03 VITALS — DIASTOLIC BLOOD PRESSURE: 78 MMHG | SYSTOLIC BLOOD PRESSURE: 130 MMHG

## 2020-03-03 PROCEDURE — 0B9K8ZX DRAINAGE OF RIGHT LUNG, VIA NATURAL OR ARTIFICIAL OPENING ENDOSCOPIC, DIAGNOSTIC: ICD-10-PCS | Performed by: INTERNAL MEDICINE

## 2020-03-03 NOTE — NUR
ASSUMED PT CARE AROUND 1910. PT WAS RESTING IN BED WITH NO C/O SOA OR N/V/D.
PT DID C/O PAIN IN CHEST AREA AND MEDICATION GIVEN PER EMAR. PT HAS BEEN NPO
SINCE MIDNIGHT IN PREPARATION FOR PROCEDURE. WILL CONTINUE TO MONITOR PT PER
PLAN OF CARE.

## 2020-03-03 NOTE — NUR
WOUND CONSULT;
VAC PLACEMENT.  WOUND IS 99% BEEFY RED TISSUE. 1% NON VIABLE.  THE FOOT AND
WOUND SHOWS MUCH IMPROVEMENT SINCE LAST HOSPITAL ADMISSION.  NO S/S OF
INFECTION.
 
RECOMMENDATION VAC APPLICATION PER DR TRAN
 
RN PRESENT

## 2020-03-03 NOTE — NUR
Met with patient who admits with Hypoxia, PNA. Patient resides in ranch style
home. He has a walker at home if needed. he reports he is up ad kavita in room.
He was rec HH from [facility] and home infusion therapy from
[facility] on [address]. Patient with wound vac and wound care following.
Patient with prior toe amputation and cellulitis. Patient reports his
employment aware of situation. He has enough paid time off he is not using
FMLA at this time. Plan home with home infusion/HH at [place]

## 2020-03-04 VITALS — DIASTOLIC BLOOD PRESSURE: 84 MMHG | SYSTOLIC BLOOD PRESSURE: 152 MMHG

## 2020-03-04 VITALS — DIASTOLIC BLOOD PRESSURE: 96 MMHG | SYSTOLIC BLOOD PRESSURE: 136 MMHG

## 2020-03-04 VITALS — DIASTOLIC BLOOD PRESSURE: 77 MMHG | SYSTOLIC BLOOD PRESSURE: 139 MMHG

## 2020-03-04 VITALS — DIASTOLIC BLOOD PRESSURE: 91 MMHG | SYSTOLIC BLOOD PRESSURE: 168 MMHG

## 2020-03-04 VITALS — SYSTOLIC BLOOD PRESSURE: 166 MMHG | DIASTOLIC BLOOD PRESSURE: 86 MMHG

## 2020-03-04 VITALS — SYSTOLIC BLOOD PRESSURE: 163 MMHG | DIASTOLIC BLOOD PRESSURE: 98 MMHG

## 2020-03-04 VITALS — SYSTOLIC BLOOD PRESSURE: 155 MMHG | DIASTOLIC BLOOD PRESSURE: 100 MMHG

## 2020-03-04 NOTE — NUR
ASSUMED PT CARE AROUND 1910. PT WAS RESTING IN BED WITH C/O PAIN IN CHEST AS A
 10/10. PAIN MEDICATION GIVEN PER EMAR WITH PARTIAL RELIEF PROVIDED. PT HAD NO
C/O N/V/D OR SOA. PT RESTED THRU NIGHT WITH MINIMAL INTERRUPTIONS. WILL
MONITOR THRU SHIFT ACCORDING TO PLAN OF CARES.

## 2020-03-04 NOTE — NUR
ASSUMMED PT CARE AT APPROXIMATELY 0700. PT A&O X4. ASSESSMENT AS CHARTED. FALL
PRECAUTIONS IN PLACE. PT DENIES HAVING CHEST PAIN. PT DENIES HAVING SOB. PT
STATED HE HAD ACUTE PAIN. PT RECIEVED ANALGESICS. PT STATED ANALGESICS WERE
NOT HELPING PAIN. INFORMED DR. NGUYEN. DR. NGUYEN ORDERED NEW ANALGESICS. PT
RECIEVED ANALGESICS. PT STATED ANALGESICS HELEPED RELIEVE PAIN. VITAL SIGNS
STABLE. BLOOD SUGARS STABLE. PT COMFORTABLE IN BED. PT DENIES HAVING FURTHER
CONCERNS.

## 2020-03-05 VITALS — DIASTOLIC BLOOD PRESSURE: 95 MMHG | SYSTOLIC BLOOD PRESSURE: 153 MMHG

## 2020-03-05 VITALS — SYSTOLIC BLOOD PRESSURE: 158 MMHG | DIASTOLIC BLOOD PRESSURE: 94 MMHG

## 2020-03-05 VITALS — DIASTOLIC BLOOD PRESSURE: 97 MMHG | SYSTOLIC BLOOD PRESSURE: 143 MMHG

## 2020-03-05 VITALS — SYSTOLIC BLOOD PRESSURE: 171 MMHG | DIASTOLIC BLOOD PRESSURE: 73 MMHG

## 2020-03-05 LAB
ANION GAP SERPL CALC-SCNC: 8 MMOL/L (ref 7–16)
BUN SERPL-MCNC: 35 MG/DL (ref 7–18)
CALCIUM SERPL-MCNC: 9 MG/DL (ref 8.5–10.1)
CHLORIDE SERPL-SCNC: 102 MMOL/L (ref 98–107)
CO2 SERPL-SCNC: 27 MMOL/L (ref 21–32)
CREAT SERPL-MCNC: 1.2 MG/DL (ref 0.7–1.3)
ERYTHROCYTE [DISTWIDTH] IN BLOOD BY AUTOMATED COUNT: 16.8 % (ref 10.5–14.5)
GLUCOSE SERPL-MCNC: 288 MG/DL (ref 74–106)
H CAPSUL MYC AB TITR SER CF: (no result) {TITER}
HCT VFR BLD CALC: 39.4 % (ref 42–52)
HGB BLD-MCNC: 12.6 GM/DL (ref 14–18)
MCH RBC QN AUTO: 27.4 PG (ref 26–34)
MCHC RBC AUTO-ENTMCNC: 32 G/DL (ref 28–37)
MCV RBC: 85.6 FL (ref 80–100)
PLATELET # BLD: 206 THOU/UL (ref 150–400)
POTASSIUM SERPL-SCNC: 4.5 MMOL/L (ref 3.5–5.1)
RBC # BLD AUTO: 4.6 MIL/UL (ref 4.5–6)
SODIUM SERPL-SCNC: 137 MMOL/L (ref 136–145)
WBC # BLD AUTO: 8.4 THOU/UL (ref 4–11)

## 2020-03-05 NOTE — NUR
[facility].
[facility] SHE RECEIVED ORDER AND WILL NOTIFY PT TIME OF VISITS.
FAXED DC ORDERS/SUMMARY AND ID NOTES FROM TODAY TO [facility] SPOKE WITH
UZMA IN INTAKS SHE RECEIVED THE ORDER AND WILL HAVE MED/SUPPLIES DELIVERED TO
PT'S HOME THIS EVENING.

## 2020-03-05 NOTE — NUR
PATIENTS CARES WERE ASSUMED AT SHIFT CHANGE. PATIENT WAS ASSESSED AND MEDS
WERE PASSED. PATIENTS ONLY COMPLAINT TO THIS NURSE WAS HIS HOME MEDATION WAS
NOT ORDERED AND HIS PAIN NEVER HAS BEED THIS HIGI. PATIENT VOIDS IN THE
BEDSIDE COMMODE.HOURLY ROUNDS WERE DONE. THE BED IS IN A LOW AND LOCK POSITION

## 2020-03-05 NOTE — NUR
patients care was brief. patient was discharged to home with home health.
patient was off the property at approx 1940. discharge instruction was given
to the patient by the day nurse and papers signed.

## 2020-03-05 NOTE — NUR
Dc plan discussed with the care team and confirmed with the pt at bedside. Pt
to resume home iv infusion with new atb: Cefezolyn 1gm q8, labs and picc
care per [facility] and hh Rn and therapy per [facility]
with MWF wound vac dressing changes. Dc planner to fax orders to Darien blake
and [facility]. Their liasons have been alerted to resumption of care
orders. Pt's friend will be here around 7pm to take him home. Pt to have his
5:30pm dose prior to dc. Pt is agreeable to the dc and able to manage his own
iv atb this evening. He has been on this drug and dosing schedule before. He
had a picc line dressing change yesterday. [facility] to deliever his iv meds
tonight.

## 2020-03-05 NOTE — NUR
PT CARE ASSUMED APPROX 0700. ASSESSMENT AS CHARTED. DENIES SOA. REPORTS
ADEQUATE PAIN MANAGEMENT ONCE HOME REGIMINE STARTED THIS AM. UP WITH STEADY
GAIT. WOUND VAC C/D/I. PT TO DISCHARGE THIS EVENING. PICC LINE TO BE KEPT IN
PLACE. LINE IS FUNCTIONING AT THIS TIME. URINARY CATH DISCONTINUED. PT HAD
SOME CATH TRAUMA AND THERE WAS NOTED HEMATURIA UPON REMOVAL. WILL MONITOR FOR
POST VOID. PT TRANSPORTATION HOME WILL BE LATE THIS EVENING. WILL PASS ON TO
NOC NURSE. DISCHARGE WILL BE COMPLETE.

## 2020-03-06 NOTE — EKG
[facility]
Elida Garza
[place]   [address]                     ELECTROCARDIOGRAM REPORT      
_______________________________________________________________________________
 
Name:       JO ANN ESPOSITO                Room #:         206-P       DIS IN  
M.R.#:      [id number]                       Account #:      [id number]  
Admission:  20    Attend Phys:    Carlita Capps MD  
Discharge:  20    Date of Birth:  [birth year]  
                                                          Report #: [id number]
                                                                    [id number]
_______________________________________________________________________________
THIS REPORT FOR:  
 
cc:  Mariano Vega James A. DO Park, Jin S. MD                                                   ~
THIS REPORT FOR:   //name//                          
 
                         [facility] ED
                                       
Test Date:    2020               Test Time:    22:35:44
Pat Name:     JO ANN ESPOSITO           Department:   
Patient ID:   [id number]            Room:         206 P
Gender:       M                        Technician:   johnathan
:          1970               Requested By: Sumaya Mcgovern
Order Number: [id number] MD:   Thaddeus Caraballo
                                 Measurements
Intervals                              Axis          
Rate:         102                      P:            71
NM:           170                      QRS:          55
QRSD:         99                       T:            5
QT:           364                                    
QTc:          475                                    
                           Interpretive Statements
Sinus tachycardia
Nonspecific T abnormalities, anterior leads
Compared to ECG 2020 16:22:19
No significant changes
 
Electronically Signed On 2020 12:53:12 CST by Thaddeus Caraballo
[email or web address]
 
 
 
 
 
 
 
 
 
 
 
 
 
 
  <ELECTRONICALLY SIGNED>
   By: Thaddeus Caraballo MD               
  20     1253
D: 20                           _____________________________________
T: 20                           Thaddeus Caraballo MD                 /EPI

## 2020-03-06 NOTE — EKG
[facility]
Elida [place]   [address]                     ELECTROCARDIOGRAM REPORT      
_______________________________________________________________________________
 
Name:       JO ANN ESPOSITO                Room #:         206-P       DIS IN  
M.R.#:      [id number]                       Account #:      [id number]  
Admission:  20    Attend Phys:    Carlita Capps MD  
Discharge:  20    Date of Birth:  [birth year]  
                                                          Report #: [id number]
                                                                    [id number]
_______________________________________________________________________________
THIS REPORT FOR:  
 
cc:  Mariano Vega James A. DO Park, Jin S. MD                                                   ~
THIS REPORT FOR:   //name//                          
 
                          [facility]
                                       
Test Date:    2020               Test Time:    07:32:36
Pat Name:     JO ANN ESPOSITO           Department:   
Patient ID:   [id number]            Room:         206
Gender:       M                        Technician:   ADELIA DAVE
:          1970               Requested By: Sonia Khalil
Order Number: [id number] MD:   Thaddeus Caraballo
                                 Measurements
Intervals                              Axis          
Rate:         98                       P:            58
[place]:           169                      QRS:          22
QRSD:         92                       T:            0
QT:           391                                    
QTc:          500                                    
                           Interpretive Statements
Sinus rhythm
Abnormal T, consider ischemia, anterior leads
Compared to ECG 2020 16:22:19
Possible ischemia now present
Sinus tachycardia no longer present
T-wave abnormality still present
 
Electronically Signed On 2020 12:53:38 CST by Thaddeus Caraballo
[email or web address]
 
 
 
 
 
 
 
 
 
 
 
 
  <ELECTRONICALLY SIGNED>
   By: Thaddeus Caraballo MD               
  20     1253
D: 2032                           _____________________________________
T: 2032                           Thaddeus Caraballo MD                 /EPI

## 2020-03-06 NOTE — EKG
[facility]
[place]   [address]                     ELECTROCARDIOGRAM REPORT      
_______________________________________________________________________________
 
Name:       JO ANN ESPOSITO                Room #:         206-P       [facility] [place]  
M.R.#:      [id number]                       Account #:      [id number]  
Admission:  20    Attend Phys:    Carlita Capps MD  
Discharge:  20    Date of Birth:  [birth year]  
                                                          Report #: [id number]
                                                                    [id number]
_______________________________________________________________________________
THIS REPORT FOR:  
 
cc:  Mariano Vega James A. DO Park, Jin S. MD                                                   ~
THIS REPORT FOR:   //name//                          
 
                         [facility] ED
                                       
Test Date:    2020               Test Time:    16:22:19
Pat Name:     JO ANN ESPOSITO           Department:   
Patient ID:   [id number]            Room:          
Gender:       M                        Technician:   AVA
:          1970               Requested By: Terry Guillory
Order Number: [id number] MD:   Thaddesu Caraballo
                                 Measurements
Intervals                              Axis          
Rate:         121                      P:            61
IL:           146                      QRS:          53
QRSD:         100                      T:            -39
QT:           350                                    
QTc:          497                                    
                           Interpretive Statements
Sinus tachycardia
Low voltage, precordial leads
Borderline T abnormalities, diffuse leads
Borderline prolonged QT interval
Compared to ECG 2020 14:24:15
Low QRS voltage now present
T-wave abnormality now present
Poor R-wave progression no longer present
ST (T wave) deviation no longer present
 
Electronically Signed On 2020 16:37:53 CST by Thaddeus Caraballo
[email or web address]
 
 
 
 
 
 
 
 
 
  <ELECTRONICALLY SIGNED>
   By: Thaddeus Caraballo MD               
  20     1637
D: 20                           _____________________________________
T: 20 162                           Thaddeus Caraballo MD                 /EPI

## 2020-03-09 NOTE — HC
[facility]
Elida Garza
[place]   [address]                     CONSULTATION                  
_______________________________________________________________________________
 
Name:       JO ANN ESPOSITO                Room #:         206-P       [facility] [place]  
M.R.#:      [id number]                       Account #:      [id number]  
Admission:  02/28/20    Attend Phys:    Carlita Capps MD  
Discharge:  03/05/20    Date of Birth:  [birth year]  
                                                          Report #: [id number]
                                                                    [id number]   
_______________________________________________________________________________
THIS REPORT FOR:  
 
cc:  Mariano Vega,Cruz Menjivar MD                                        ~
CC: Mariano Capps
 
DATE OF SERVICE:  03/02/2020
 
 
HISTORY OF PRESENT ILLNESS:  This is a 50-year-old male patient with whom I am
familiar from previous hospitalization.  He underwent a third toe amputation,
has an open surgical wound.  We have been managing with a wound VAC.  He had an
osteomyelitis of the third metatarsal and has undergone bony debridement.  He is
admitted to the hospital at this time due to respiratory difficulties and has
been having no problems with his foot.  I have been consulted to continue doing
wound management here.
 
PAST MEDICAL HISTORY:  Positive for history of hypertension, DVT, diabetes,
peripheral neuropathy, osteomyelitis of the left foot with third toe and
metatarsal resection.
 
SOCIAL HISTORY:  Negative for alcohol or tobacco use.
 
FAMILY HISTORY:  Noncontributory.
 
MEDICATIONS:  Include Cymbalta, Trulicity, Jardiance, Cardizem, Neurontin,
Tresiba, Humalog, furosemide, daptomycin, enteric coated aspirin.
 
ALLERGIES:  No known drug allergies.
 
REVIEW OF SYSTEMS:
CONSTITUTIONAL:  The patient denies fever, chills or weight loss.
NEUROLOGICAL:  The patient denies focal weakness, numbness or tingling.
EYES:  The patient denies visual changes, redness, or drainage.
ENT:  The patient denies earache, nasal drainage or sore throat.
CARDIOVASCULAR:  The patient denies chest pain, palpitations or diaphoresis.
PULMONARY:  The patient complains of some shortness of breath and difficulty
with deep inspiration.
GASTROINTESTINAL:  The patient denies nausea, vomiting, diarrhea or abdominal
pain.
GENITOURINARY:  The patient denies frequency or urgency of urination.  Denies
dysuria.
ORTHOPEDIC:  The patient notes the wound on his left foot.  Denies any pain
associated with this.
 
 
 
[facility]
[address]
[place]   [address]                     CONSULTATION                  
_______________________________________________________________________________
 
Name:       JO ANN ESPOSITO                Room #:         206-P       [facility] [place]  
M.R.#:      [id number]                       Account #:      [id number]  
Admission:  02/28/20    Attend Phys:    Carlita Capps MD  
Discharge:  03/05/20    Date of Birth:  [birth year]  
                                                          Report #: [id number]
                                                                    [id number]   
_______________________________________________________________________________
Other systems in a 14-point review of systems are negative.
 
PHYSICAL EXAMINATION:
VITAL SIGNS:  At this time include temperature 36.7, pulse 71, respiratory rate
18, blood pressure 101/89.
GENERAL:  This is a well-developed male patient who appears to be in minimal
distress.
HEENT:  Head normocephalic.  Nose and throat are clear.
NECK:  Supple.
LUNGS:  Diminished.
HEART:  Regular.
ABDOMEN:  Soft.  Bowel sounds present.
EXTREMITIES:  Lower extremities demonstrate palpable distal pulses.  Surgical
wound on the dorsal aspect of the left foot is healthy, clean and granulating
with no evidence of overt infection.
NEUROLOGIC:  The patient is alert, oriented and appropriate.
 
LABORATORY STUDIES:  Include sodium 136, potassium 4.2, CO2 26, BUN 65,
creatinine 1.5, glucose 188, albumin is 3.6.  White blood cell count of 6.9 with
a hemoglobin of 12.9, albumin is 3.6.
 
CLINICAL IMPRESSION:
1.  Surgical wound to the dorsal foot, status post excision of osteomyelitic
third metatarsal.
2.  Diabetes type 2.
3.  Hypertension.
4.  History of deep venous thrombosis and pulmonary embolus.
 
RECOMMENDATIONS:  At this point in time, we will continue with [employer] wound VAC at
125 mmHg using black foam to be changed on a 3-day a week basis and intravenous
antibiotics per ID.  Continuation of other current medications, ongoing
nutritional support.  I appreciate being asked to see him in consultation.
 
 
 
 
 
 
 
 
 
 
 
 
  <ELECTRONICALLY SIGNED>
   By: Cruz Sorto MD        
  03/09/20     0744
D: 03/04/20 1919                           _____________________________________
T: 03/05/20 0131                           Cruz Sorto MD          /nt

## 2020-03-10 ENCOUNTER — HOSPITAL ENCOUNTER (OUTPATIENT)
Dept: HOSPITAL 35 - HYPER | Age: 50
End: 2020-03-10
Attending: PREVENTIVE MEDICINE
Payer: COMMERCIAL

## 2020-03-10 ENCOUNTER — HOSPITAL ENCOUNTER (INPATIENT)
Dept: HOSPITAL 35 - ER | Age: 50
LOS: 7 days | Discharge: HOME HEALTH | DRG: 177 | End: 2020-03-17
Attending: HOSPITALIST | Admitting: HOSPITALIST
Payer: COMMERCIAL

## 2020-03-10 VITALS — DIASTOLIC BLOOD PRESSURE: 79 MMHG | SYSTOLIC BLOOD PRESSURE: 133 MMHG

## 2020-03-10 VITALS — WEIGHT: 315 LBS | BODY MASS INDEX: 41.75 KG/M2 | HEIGHT: 72.99 IN

## 2020-03-10 VITALS — SYSTOLIC BLOOD PRESSURE: 93 MMHG | DIASTOLIC BLOOD PRESSURE: 64 MMHG

## 2020-03-10 VITALS — SYSTOLIC BLOOD PRESSURE: 113 MMHG | DIASTOLIC BLOOD PRESSURE: 60 MMHG

## 2020-03-10 DIAGNOSIS — M86.8X7: ICD-10-CM

## 2020-03-10 DIAGNOSIS — E11.21: ICD-10-CM

## 2020-03-10 DIAGNOSIS — Z86.711: ICD-10-CM

## 2020-03-10 DIAGNOSIS — E11.22: ICD-10-CM

## 2020-03-10 DIAGNOSIS — E11.65: ICD-10-CM

## 2020-03-10 DIAGNOSIS — Z79.82: ICD-10-CM

## 2020-03-10 DIAGNOSIS — I95.9: ICD-10-CM

## 2020-03-10 DIAGNOSIS — Z79.51: ICD-10-CM

## 2020-03-10 DIAGNOSIS — Z79.84: ICD-10-CM

## 2020-03-10 DIAGNOSIS — Z87.01: ICD-10-CM

## 2020-03-10 DIAGNOSIS — Z86.718: ICD-10-CM

## 2020-03-10 DIAGNOSIS — R60.0: ICD-10-CM

## 2020-03-10 DIAGNOSIS — J96.21: ICD-10-CM

## 2020-03-10 DIAGNOSIS — E11.40: ICD-10-CM

## 2020-03-10 DIAGNOSIS — E11.319: ICD-10-CM

## 2020-03-10 DIAGNOSIS — E66.2: ICD-10-CM

## 2020-03-10 DIAGNOSIS — E11.69: ICD-10-CM

## 2020-03-10 DIAGNOSIS — J15.0: Primary | ICD-10-CM

## 2020-03-10 DIAGNOSIS — E11.51: ICD-10-CM

## 2020-03-10 DIAGNOSIS — Z79.4: ICD-10-CM

## 2020-03-10 DIAGNOSIS — M86.372: ICD-10-CM

## 2020-03-10 DIAGNOSIS — L97.522: ICD-10-CM

## 2020-03-10 DIAGNOSIS — Z79.01: ICD-10-CM

## 2020-03-10 DIAGNOSIS — N18.9: ICD-10-CM
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
## 2020-03-10 DIAGNOSIS — Z83.3: ICD-10-CM

## 2020-03-10 DIAGNOSIS — I27.20: ICD-10-CM

## 2020-03-10 DIAGNOSIS — Z86.14: ICD-10-CM

## 2020-03-10 DIAGNOSIS — E44.0: ICD-10-CM

## 2020-03-10 DIAGNOSIS — I13.0: ICD-10-CM

## 2020-03-10 DIAGNOSIS — Z89.432: ICD-10-CM

## 2020-03-10 DIAGNOSIS — T87.81: Primary | ICD-10-CM

## 2020-03-10 DIAGNOSIS — E11.42: ICD-10-CM

## 2020-03-10 DIAGNOSIS — E11.621: ICD-10-CM

## 2020-03-10 DIAGNOSIS — Z82.49: ICD-10-CM

## 2020-03-10 DIAGNOSIS — N17.9: ICD-10-CM

## 2020-03-10 DIAGNOSIS — Z79.899: ICD-10-CM

## 2020-03-10 DIAGNOSIS — L84: ICD-10-CM

## 2020-03-10 DIAGNOSIS — I50.33: ICD-10-CM

## 2020-03-10 LAB
ALBUMIN SERPL-MCNC: 3.4 G/DL (ref 3.4–5)
ALT SERPL-CCNC: < 6 U/L (ref 30–65)
ANION GAP SERPL CALC-SCNC: 16 MMOL/L (ref 7–16)
AST SERPL-CCNC: 13 U/L (ref 15–37)
BE(VIVO): -6.6 MMOL/L
BILIRUB SERPL-MCNC: 0.8 MG/DL
BUN SERPL-MCNC: 42 MG/DL (ref 7–18)
CALCIUM SERPL-MCNC: 9.7 MG/DL (ref 8.5–10.1)
CHLORIDE SERPL-SCNC: 100 MMOL/L (ref 98–107)
CO2 SERPL-SCNC: 20 MMOL/L (ref 21–32)
CREAT SERPL-MCNC: 1.4 MG/DL (ref 0.7–1.3)
EOSINOPHIL NFR BLD: 1 % (ref 0–3)
ERYTHROCYTE [DISTWIDTH] IN BLOOD BY AUTOMATED COUNT: 16.8 % (ref 10.5–14.5)
GLUCOSE SERPL-MCNC: 220 MG/DL (ref 74–106)
GRANULOCYTES NFR BLD MANUAL: 56 % (ref 36–66)
HCO3 BLD-SCNC: 15.1 MMOL/L (ref 22–26)
HCT VFR BLD CALC: 40.7 % (ref 42–52)
HGB BLD-MCNC: 13.2 GM/DL (ref 14–18)
LIPASE: 149 U/L (ref 73–393)
LYMPHOCYTES NFR BLD AUTO: 29 % (ref 24–44)
MCH RBC QN AUTO: 27.7 PG (ref 26–34)
MCHC RBC AUTO-ENTMCNC: 32.5 G/DL (ref 28–37)
MCV RBC: 85.2 FL (ref 80–100)
MONOCYTES NFR BLD: 11 % (ref 1–8)
NEUTROPHILS # BLD: 3.8 THOU/UL (ref 1.4–8.2)
NEUTS BAND NFR BLD: 3 % (ref 0–8)
PCO2 BLD: 22 MMHG (ref 35–45)
PLATELET # BLD EST: (no result) 10*3/UL
PLATELET # BLD: 49 THOU/UL (ref 150–400)
PO2 BLD: 65.6 MMHG (ref 80–100)
POTASSIUM SERPL-SCNC: 4 MMOL/L (ref 3.5–5.1)
PROT SERPL-MCNC: 7.8 G/DL (ref 6.4–8.2)
RBC # BLD AUTO: 4.77 MIL/UL (ref 4.5–6)
RBC MORPH BLD: NORMAL
SODIUM SERPL-SCNC: 136 MMOL/L (ref 136–145)
TROPONIN I SERPL-MCNC: 0.25 NG/ML (ref ?–0.06)
WBC # BLD AUTO: 6.5 THOU/UL (ref 4–11)

## 2020-03-10 PROCEDURE — 10879: CPT

## 2020-03-10 NOTE — NUR
WRITER CALLED TO GIVE REPORT TO INPATIENT NURSE, WAS TOLD NURSE IS THE MIDDLE
OF GETTING REPORT FOR SHIFT CHANGE.

## 2020-03-10 NOTE — EKG
[facility]
[place]   [address]                     ELECTROCARDIOGRAM REPORT      
_______________________________________________________________________________
 
Name:       JO ANN ESPOSITO                Room #:                     PRE   
M.R.#:      [id number]                       Account #:      [id number]  
Admission:              Attend Phys:                          
Discharge:              Date of Birth:  [birth year]  
                                                          Report #: [id number]
                                                                    [id number]
_______________________________________________________________________________
THIS REPORT FOR:  
 
cc:  Mariano Vega James A. DO Couchonnal, Luis F. MD                                            ~
THIS REPORT FOR:   //name//                          
 
                         [facility] ED
                                       
Test Date:    2020-03-10               Test Time:    16:57:50
Pat Name:     JO ANN ESPOSITO           Department:   
Patient ID:   [id number]            Room:          
Gender:                               Technician:   [facility]
:          1970               Requested By: Juany Escalera
Order Number: [id number] MD:   Chepe Rosenbaum
                                 Measurements
Intervals                              Axis          
Rate:         104                      P:            61
GA:           166                      QRS:          17
QRSD:         100                      T:            -57
QT:           395                                    
QTc:          520                                    
                           Interpretive Statements
Sinus tachycardia
Low voltage, precordial leads
Abnrm T, consider ischemia, anterolateral lds
Baseline wander in lead(s) V1
Compared to ECG 2020 07:32:36
 
Electronically Signed On 3- 17:23:04 CDT by Chepe Rosenbaum
[email or web address]
 
 
 
 
 
 
 
 
 
 
 
 
 
  <ELECTRONICALLY SIGNED>
   By: Chepe Rosenbaum MD        
  03/10/20     1723
D: 03/10/20 165                           _____________________________________
T: 03/10/20 165                           Chepe Rosenbaum MD          /EPI

## 2020-03-10 NOTE — HC
[facility]
Elida Garza
[place]   [address]                     CONSULTATION                  
_______________________________________________________________________________
 
Name:       JO ANN ESPOSITO                Room #:         359-P       [place]  
M.R.#:      [id number]                       Account #:      [id number]  
Admission:  03/10/20    Attend Phys:    Vaughn Botello MD    
Discharge:              Date of Birth:  [birth year]  
                                                          Report #: [id number]
                                                                    [id number]   
_______________________________________________________________________________
THIS REPORT FOR:  
 
cc:  Mariano Vega James A. DO Smithson, David G. MD                                         ~
CC: Vaughn Chambers
 
DATE OF SERVICE:  03/16/2020
 
 
HISTORY OF PRESENT ILLNESS:  The patient is a 50-year-old white male admitted
with increased shortness of breath, CHF new onset, acute renal insufficiency
superimposed on chronic kidney disease.  History of recent pneumonia.  He has
been treated medically.  Notes he is feeling much better today.  He has been
treated with Lasix limiting fluids.  He has a prior history of osteomyelitis of
the left foot on cefazolin.  Infectious Disease has been involved in managing
his antibiotics.  We are seeing him in rehabilitation medicine consultation.
 
PAST MEDICAL HISTORY:  Includes pneumonia, diabetes, peripheral vascular
disease, osteomyelitis, left foot, MRSA, and hypertension.
 
ALLERGIES:  No known drug allergies.
 
MEDICATIONS:  Please see the full medication listing.
 
HABITS:  No history of tobacco or alcohol abuse.
 
SOCIAL HISTORY:  Lives in a house alone, two steps, not utilizing gait aids.
 
REVIEW OF SYSTEMS:  No current complaints of chest pain, shortness of breath or
abdominal discomfort.
 
PHYSICAL EXAMINATION:
GENERAL:  A 50-year-old white male in no obvious distress.
VITAL SIGNS:  Last recorded temperature 96.5, pulse 74, respirations 16, blood
pressure 151/92.  He is alert, pleasant.
HEENT:  Appeared to be benign.
NEUROLOGIC:  Cranial nerves are grossly intact.  Facies are symmetric.
EXTREMITIES:  Functional range of motion of both upper extremities he has got
excellent upper body strength 5/5.  Lower extremities functional range of motion
his left foot is dressed, but he has excellent strength of the right lower
extremity at least to 4+/5.  Left lower extremity was probably 4+/5.  He
ambulated without a gait aid, standby assistance out into the hallway.
 
 
 
 
[facility]
[address] [place]   [address]                     CONSULTATION                  
_______________________________________________________________________________
 
Name:       JO ANN ESPOSITO                Room #:         359-P       [facility] [place]  
..#:      [id number]                       Account #:      [id number]  
Admission:  03/10/20    Attend Phys:    Vaughn Botello MD    
Discharge:              Date of Birth:  [birth year]  
                                                          Report #: [id number]
                                                                    [id number]   
_______________________________________________________________________________
ASSESSMENT:  A 50-year-old white male with the following problem list:
1.  Congestive heart failure.
2.  Generalized weakness and debilitation, doing much better.
3.  Acute renal insufficiency superimposed on chronic kidney disease.
4.  History of recent pneumonia.
5.  Osteomyelitis of the left foot, on IV antibiotics.
6.  History of peripheral vascular disease.
7.  Diabetes mellitus type 2.
8.  History of deep venous thrombosis and pulmonary embolism.
9.  History of methicillin-resistant Staphylococcus aureus.
 
PLAN:  The patient had a lot more problems with functional mobility on Friday
03/13/2020.  He was sleepy at that time and did not do as well.  Today, he did
much better in physical therapy as discussed.  He appears to be too high level
at this point to warrant an acute in-hospital inpatient rehabilitation stay. 
Would anticipate that he could hopefully return back to the home setting once he
is medically cleared.
 
Thank you for asking us to assist in this patient's care.
 
 
 
 
 
 
 
 
 
 
 
 
 
 
 
 
 
 
 
 
 
 
 
 
 
                         
   By:                               
                   
D: 03/16/20 1205                           _____________________________________
T: 03/16/20 1909                           Jules Pelaez MD           /PMT

## 2020-03-10 NOTE — NUR
CONTACTED THE PT'S GIRLFRIEND, AT THE REQUEST OF THE PATIENT.  SHE HAS 
REQUESTED THAT SHE BE CONTACTED WITH ANY CHANGES.

## 2020-03-11 VITALS — DIASTOLIC BLOOD PRESSURE: 51 MMHG | SYSTOLIC BLOOD PRESSURE: 91 MMHG

## 2020-03-11 VITALS — DIASTOLIC BLOOD PRESSURE: 54 MMHG | SYSTOLIC BLOOD PRESSURE: 85 MMHG

## 2020-03-11 VITALS — SYSTOLIC BLOOD PRESSURE: 107 MMHG | DIASTOLIC BLOOD PRESSURE: 67 MMHG

## 2020-03-11 VITALS — SYSTOLIC BLOOD PRESSURE: 138 MMHG | DIASTOLIC BLOOD PRESSURE: 114 MMHG

## 2020-03-11 VITALS — SYSTOLIC BLOOD PRESSURE: 83 MMHG | DIASTOLIC BLOOD PRESSURE: 49 MMHG

## 2020-03-11 VITALS — SYSTOLIC BLOOD PRESSURE: 98 MMHG | DIASTOLIC BLOOD PRESSURE: 61 MMHG

## 2020-03-11 LAB
ANION GAP SERPL CALC-SCNC: 12 MMOL/L (ref 7–16)
BUN SERPL-MCNC: 43 MG/DL (ref 7–18)
CALCIUM SERPL-MCNC: 8.6 MG/DL (ref 8.5–10.1)
CHLORIDE SERPL-SCNC: 102 MMOL/L (ref 98–107)
CO2 SERPL-SCNC: 24 MMOL/L (ref 21–32)
CREAT SERPL-MCNC: 1.6 MG/DL (ref 0.7–1.3)
ERYTHROCYTE [DISTWIDTH] IN BLOOD BY AUTOMATED COUNT: 17.1 % (ref 10.5–14.5)
GLUCOSE SERPL-MCNC: 204 MG/DL (ref 74–106)
HCT VFR BLD CALC: 37.9 % (ref 42–52)
HGB BLD-MCNC: 12 GM/DL (ref 14–18)
MCH RBC QN AUTO: 27.4 PG (ref 26–34)
MCHC RBC AUTO-ENTMCNC: 31.8 G/DL (ref 28–37)
MCV RBC: 86.1 FL (ref 80–100)
PLATELET # BLD: 52 THOU/UL (ref 150–400)
POTASSIUM SERPL-SCNC: 4.1 MMOL/L (ref 3.5–5.1)
RBC # BLD AUTO: 4.4 MIL/UL (ref 4.5–6)
SODIUM SERPL-SCNC: 138 MMOL/L (ref 136–145)
WBC # BLD AUTO: 7.1 THOU/UL (ref 4–11)

## 2020-03-11 NOTE — NUR
assumed care of pt at 0700. pt alert and oriented, complaining of chest
discomfort, cardio aware, and feeling generally lousy. started on 80mg iv
lasix - voided approx 400 cc concentrated appearing urine.  bp low but stable.
good pain relief with morphine Q4H.  not screening positive in sepssis
screening.  physician notified of said changes.  pt calls out appropriately.
will cont to monitor.

## 2020-03-11 NOTE — 2DMMODE
[address]   [address]                   2 D/M-MODE ECHOCARDIOGRAM     
_______________________________________________________________________________
 
Name:       JO ANN ESPOSITO                Room #:         359-P       [place]  
M.R.#:      [id number]                       Account #:      [id number]  
Admission:  03/10/20    Attend Phys:    Vaughn Botello MD    
Discharge:              Date of Birth:  [birth year]  
                                                          Report #: [id number]
                                                                    [id number]
_______________________________________________________________________________
THIS REPORT FOR:  
 
cc:  Mariano Vega James A. DO Park, Jin S. MD                                                   
                                                                       ~
 
--------------- APPROVED REPORT --------------
 
 
Study performed:  03/11/2020 09:35:46
 
EXAM: Comprehensive 2D, Doppler, and color-flow 
Echocardiogram 
Patient Location: Echo lab   
Room #:  359     Status:  routine
 
     BSA:         2.70
HR: 91 bpm BP:          98/61 mmHg 
Rhythm: NSR    
 
Other Information 
Study Quality: Adequate
 
Indications
Congestive Heart Failure
Hypertension/HDD
 
2D Dimensions
 IVC:  25.00 mm
 
Tricuspid Valve
TR Peak Landon.:  3.53 m/s  
TR Peak Gr.:  49.92 mmHg 
    PA Pressure:  60.00 mmHg
 
Left Ventricle
The left ventricle is normal size. There is normal LV segmental wall 
motion. There is normal left ventricular wall thickness. The left 
ventricular systolic function is normal. LVEF is 55-60%. This study 
is not technically sufficient to allow evaluation of the LV diastolic 
function.
 
Right Ventricle
Right ventricle is dilated. Right ventricle is 
hypokinetic.
 
 
[address] [place]  [address]
Phone:  [phone] 2 D/M-MODE ECHOCARDIOGRAM     
_______________________________________________________________________________
 
Name:            JO ANN ESPOSITO                Room #:        359-P       [facility] [place]
M.R.#:           [id number]          Account #:     [id number]  
Admission:       03/10/20         Attend Phys:   Vaughn Botello MD
Discharge:                  Date of Birth: [birth year]  
                         Report #:      [id number]
        [id number]
_______________________________________________________________________________
 
Atria
The left atrium size is normal. Right atrium is dilated.
 
Aortic Valve
The aortic valve is normal in structure. No aortic regurgitation is 
present.
 
Mitral Valve
The mitral valve is normal in structure. There is no mitral valve 
regurgitation noted.
 
Tricuspid Valve
The tricuspid valve is normal in structure. There is mild tricuspid 
regurgitation. Estimated PAP 60 mmHg. There is moderate pulmonary 
hypertension.
 
Great Vessels
The aortic root is normal in size. IVC is dilated and collapses 
>50% with inspiration.
 
Pericardium
There is no pericardial effusion.
 
<Conclusion>
The left ventricle is normal size.
There is normal left ventricular wall thickness.
The left ventricular systolic function is normal.
Right ventricle is dilated.
Right atrium is dilated.
The aortic valve is normal in structure.
There is no mitral valve regurgitation noted.
There is mild tricuspid regurgitation. Estimated PAP 60 mmHg.
There is moderate pulmonary hypertension.
 
 
 
 
 
 
 
 
 
 
  <ELECTRONICALLY SIGNED>
   By: Thaddeus Caraballo MD               
  03/11/20     1027
D: 03/11/20 1027                           _____________________________________
T: 03/11/20 1027                           Thaddeus Caraballo MD                 /INF

## 2020-03-11 NOTE — NUR
PT ARRIVED FROM ER VIA BED, PLACED IN ROOM 359.  ADMISSION ASSESSMENTS
COMPLETED.  PT DENIES ANY SOA WHILE AT REST BUT REPORTS THAT HE WAS HAVING
WORSENING SOA WHEN AMBULATING.  O2 AT 3L PER NC CURRENTLY.  MORPHINE FOR C/O
RIGHT SIDED CHEST DISCOMFORT AND PAIN IN LEFT FOOT.  PICTURE PLACED ON CHART
OF LEFT FOOT WOUND.  ASSESSMENTS ONGOING.

## 2020-03-11 NOTE — NUR
INITIAL ASSESSMENT:
SW reviewed chart and spoke with nursing and attending physician. Pt was
admitted from home due to acute respiratory failure/CHF.  Pt with osteo of the
left foot. Pt was recently discharge home with  services through
[facility] and Home IV Abx through [facility] and Wound Vac from
[facility]. BANDAR met with pt at bedside. Introduced role of SW. Pt is alert/orientated
x 4. Pt states he lives at home. Pt has a walker to use if needed. Pt reports
he is currently on service with /[facility]. Pt's wound vac has been
taken off.  Pt's PCP is Dr. Mejia. Plan is for pt to return home when
medically stable and resume prior home services.  SW notified Viry at
[facility]. Pt was on Cefazolin 1gm TID prior to admission. BANDAR is following
to assist as needed with discharge planning.

## 2020-03-12 VITALS — DIASTOLIC BLOOD PRESSURE: 64 MMHG | SYSTOLIC BLOOD PRESSURE: 106 MMHG

## 2020-03-12 VITALS — DIASTOLIC BLOOD PRESSURE: 41 MMHG | SYSTOLIC BLOOD PRESSURE: 89 MMHG

## 2020-03-12 VITALS — DIASTOLIC BLOOD PRESSURE: 63 MMHG | SYSTOLIC BLOOD PRESSURE: 106 MMHG

## 2020-03-12 VITALS — SYSTOLIC BLOOD PRESSURE: 103 MMHG | DIASTOLIC BLOOD PRESSURE: 61 MMHG

## 2020-03-12 VITALS — DIASTOLIC BLOOD PRESSURE: 70 MMHG | SYSTOLIC BLOOD PRESSURE: 114 MMHG

## 2020-03-12 LAB
ANION GAP SERPL CALC-SCNC: 11 MMOL/L (ref 7–16)
BACTERIA-REFLEX: (no result) /HPF
BILIRUB UR-MCNC: NEGATIVE MG/DL
BUN SERPL-MCNC: 53 MG/DL (ref 7–18)
CALCIUM SERPL-MCNC: 8.7 MG/DL (ref 8.5–10.1)
CHLORIDE SERPL-SCNC: 101 MMOL/L (ref 98–107)
CO2 SERPL-SCNC: 24 MMOL/L (ref 21–32)
COLOR UR: YELLOW
CREAT SERPL-MCNC: 2.1 MG/DL (ref 0.7–1.3)
GLUCOSE SERPL-MCNC: 164 MG/DL (ref 74–106)
KETONES UR STRIP-MCNC: NEGATIVE MG/DL
POTASSIUM SERPL-SCNC: 4.3 MMOL/L (ref 3.5–5.1)
RBC # UR STRIP: (no result) /UL
RBC #/AREA URNS HPF: (no result) /HPF (ref 0–2)
SODIUM SERPL-SCNC: 136 MMOL/L (ref 136–145)
SP GR UR STRIP: 1.02 (ref 1–1.03)
SQUAMOUS: (no result) /LPF (ref 0–3)
URINE CLARITY: CLEAR
URINE GLUCOSE-RANDOM*: (no result)
URINE LEUKOCYTES-REFLEX: (no result)
URINE NITRITE-REFLEX: NEGATIVE
URINE PROTEIN (DIPSTICK): NEGATIVE
UROBILINOGEN UR STRIP-ACNC: 0.2 E.U./DL (ref 0.2–1)

## 2020-03-12 PROCEDURE — 5A09357 ASSISTANCE WITH RESPIRATORY VENTILATION, LESS THAN 24 CONSECUTIVE HOURS, CONTINUOUS POSITIVE AIRWAY PRESSURE: ICD-10-PCS | Performed by: HOSPITALIST

## 2020-03-12 NOTE — NUR
BANDAR reviewed chart and spoke with nursing and attending physician. Pt is
progressing towards goals for discharge. Discharge home is anticipated in 1-2
days. Pt will resume HH services through [facility] and IV infusion
through [facility]. BANDAR faxed clinical info to both agencies for review. ID
consulted today for recommendation for IV abx. BANDAR is following to assist as
needed with discharge planning.

## 2020-03-12 NOTE — HC
[facility]
El[address] [place]   [address]                     CONSULTATION                  
_______________________________________________________________________________
 
Name:       JO ANN ESPOSITO                Room #:         359-P       ADM IN  
M.R.#:      [id number]                       Account #:      [id number]  
Admission:  03/10/20    Attend Phys:    Vaughn Botello MD    
Discharge:              Date of Birth:  [birth year]  
                                                          Report #: [id number]
                                                                    [id number]   
_______________________________________________________________________________
THIS REPORT FOR:  
 
cc:  Mariano Vega James A. DO Althoff, Jeffrey R. MD                                        ~
CC: Vaughn Chambers
 
DATE OF SERVICE:  03/11/2020
 
 
CHIEF COMPLAINT:  Surgical wound to the left foot.
 
HISTORY OF PRESENT ILLNESS:  This is a 50-year-old male patient with whom I am
familiar from multiple recent hospitalizations underwent third toe and
metatarsal head amputation on the left foot on 01/30/2020.  He has been healing
well and denies any problems or pain associated with this.  He, however, was
readmitted with increasing shortness of breath with exertion and orthopnea and I
have been asked to see him with regard to continuation of wound care.
 
PAST MEDICAL HISTORY:  Positive for history of sepsis, type 2 diabetes mellitus,
hypertension, diabetic ulcer to the left foot, bilateral pulmonary emboli, and
prior right ankle reconstruction.
 
SOCIAL HISTORY:  Negative for alcohol or tobacco use.
 
FAMILY HISTORY:  Noncontributory.
 
MEDICATIONS:  Include Cymbalta, Trulicity, Jardiance, Cardizem, Neurontin,
Tresiba, Humalog.
 
ALLERGIES:  No known drug allergies.
 
REVIEW OF SYSTEMS:
CONSTITUTIONAL:  The patient denies fever, chills, or weight loss.
NEUROLOGICAL:  The patient denies focal weakness, numbness, or tingling.
EYES:  The patient denies visual changes, redness, or drainage.
ENT:  The patient denies earache, nasal drainage, sore throat.
CARDIOVASCULAR:  The patient denies chest pain, palpitations, or diaphoresis.
PULMONARY:  The patient does complain of shortness of breath and dyspnea on
exertion.  Denies cough or sputum production.
GASTROINTESTINAL:  The patient denies nausea, vomiting, diarrhea, or abdominal
pain.
MUSCULOSKELETAL:  The patient denies back pain, muscle pain, joint pain.  Does
have the ulceration on his foot.
 
 
 
[facility]
[address]
[place]   [address]                     CONSULTATION                  
_______________________________________________________________________________
 
Name:       JO ANN ESPOSITO                Room #:         359-P       [facility] [place]  
M.R.#:      [id number]                       Account #:      [id number]  
Admission:  03/10/20    Attend Phys:    Vaughn Botello MD    
Discharge:              Date of Birth:  [birth year]  
                                                          Report #: [id number]
                                                                    [id number]   
_______________________________________________________________________________
SKIN:  The patient denies rashes, itching, or lumps.
GENITOURINARY:  The patient denies frequency or urgency of urination.  Denies
dysuria.
Other systems in a 14-point review of systems are negative.
 
PHYSICAL EXAMINATION:
VITAL SIGNS:  Include pulse rate 100, temperature 97.8, respiratory rate of 11,
blood pressure 93/64.
GENERAL:  This is a well-developed male patient who appears to be in minimal
distress.
HEENT:  Head normocephalic.  Nose and throat clear.
NECK:  Supple.
LUNGS:  Diminished.
HEART:  Regular rhythm.
ABDOMEN:  Soft, bowel sounds present, nontender.
EXTREMITIES:  Lower extremities demonstrate palpable pulses.  The surgical wound
on the left foot appears clean, healthy, granulating, and smaller than the last
time I saw him.
NEUROLOGIC:  The patient is alert, oriented, and appropriate.
 
LABORATORY STUDIES:  Include white blood cell count 7.1, hemoglobin 12.0. 
Sodium 138, potassium 4.1, chloride 102, CO2 of 24, BUN 43, creatinine 1.6,
glucose 204.
 
CLINICAL IMPRESSION:
1.  Surgical wound, left foot, following third toe and metatarsal amputation,
doing well.
2.  Type 2 diabetes mellitus with peripheral neuropathy.
3.  History of osteomyelitis.
4.  Dyspnea, possibly secondary to congestive heart failure.
5.  History of recent pneumonia.
6.  Hypertension.
 
RECOMMENDATIONS:  At this point in time, we will recommend treatment of his left
foot with topical Michelle or similar-type collagen material to be changed Monday,
Wednesday, and Friday covered with a foam-based dressing.  Weightbearing as
tolerated would be appropriate.  Recommend continuation of current medications,
physical therapy as tolerated.  He will need ongoing nutritional support to
maintain wound healing as well as to optimize glycemic control.  I appreciate
being asked to see him in consultation.
 
 
 
 
  <ELECTRONICALLY SIGNED>
   By: Cruz Sorto MD        
  03/12/20     1539
D: 03/11/20 1846                           _____________________________________
T: 03/11/20 2348                           Cruz Sorto MD          /nt

## 2020-03-12 NOTE — NUR
PT A/0X4 AND AMBULATES TO BATHROOM AD GONZALEZ.  GAVE PAIN MEDICATION X2, PT STATES
PAIN IS A 9 AND LOCATED IN CHEST AND LEFT FOOT.  EDUCATED PT ON NEED FOR URINE
SAMPLE.  AT 0420 WHILE IN PT ROOM HE STATES HE IS HAVING DIARRHEA NOW.  WILL
CONTINUE TO MONITOR.  HOURLY ROUNDING.

## 2020-03-13 VITALS — DIASTOLIC BLOOD PRESSURE: 68 MMHG | SYSTOLIC BLOOD PRESSURE: 120 MMHG

## 2020-03-13 VITALS — DIASTOLIC BLOOD PRESSURE: 66 MMHG | SYSTOLIC BLOOD PRESSURE: 115 MMHG

## 2020-03-13 VITALS — DIASTOLIC BLOOD PRESSURE: 87 MMHG | SYSTOLIC BLOOD PRESSURE: 136 MMHG

## 2020-03-13 LAB
ALBUMIN SERPL-MCNC: 2.9 G/DL (ref 3.4–5)
ANION GAP SERPL CALC-SCNC: 8 MMOL/L (ref 7–16)
BUN SERPL-MCNC: 48 MG/DL (ref 7–18)
CALCIUM SERPL-MCNC: 8.3 MG/DL (ref 8.5–10.1)
CHLORIDE SERPL-SCNC: 104 MMOL/L (ref 98–107)
CO2 SERPL-SCNC: 26 MMOL/L (ref 21–32)
CREAT SERPL-MCNC: 1.5 MG/DL (ref 0.7–1.3)
ERYTHROCYTE [DISTWIDTH] IN BLOOD BY AUTOMATED COUNT: 17.2 % (ref 10.5–14.5)
GLUCOSE SERPL-MCNC: 215 MG/DL (ref 74–106)
HCT VFR BLD CALC: 33.9 % (ref 42–52)
HGB BLD-MCNC: 10.9 GM/DL (ref 14–18)
MCH RBC QN AUTO: 28 PG (ref 26–34)
MCHC RBC AUTO-ENTMCNC: 32.1 G/DL (ref 28–37)
MCV RBC: 87.2 FL (ref 80–100)
PHOSPHATE SERPL-MCNC: 4.1 MG/DL (ref 2.5–4.9)
PLATELET # BLD: 88 THOU/UL (ref 150–400)
POTASSIUM SERPL-SCNC: 4.5 MMOL/L (ref 3.5–5.1)
RBC # BLD AUTO: 3.89 MIL/UL (ref 4.5–6)
SODIUM SERPL-SCNC: 138 MMOL/L (ref 136–145)
URINE CREATININE-RANDOM*: 92.1 MG/DL
URINE SODIUM-RANDOM*: 81 MMOL/L
WBC # BLD AUTO: 5.1 THOU/UL (ref 4–11)

## 2020-03-13 PROCEDURE — 5A09357 ASSISTANCE WITH RESPIRATORY VENTILATION, LESS THAN 24 CONSECUTIVE HOURS, CONTINUOUS POSITIVE AIRWAY PRESSURE: ICD-10-PCS | Performed by: HOSPITALIST

## 2020-03-13 NOTE — NUR
BANDAR reviewed chart and spoke with nursing and attending physician. Pt may be
ready for discharge home over the weekend. Pt will resume HH services and Home
IV abx. BANDAR sent clinical updates to [facility]  and spoke with
Licha in intake to notify of weekend discharge. SW sent updates to [facility] and notified liaison. Pt to be on Avycaz 2.5g every 8 hours. Contact info
for HH and Home Infusion placed in pt's discharge summary. Final discharge
orders/summary will need to be faxed when available. BANDAR is available to assist
as needed with discharge planning.
[facility] --Phone: [phone]   Fax: [phone]
[facility]--Phone: [phone]  Fax: [phone]

## 2020-03-13 NOTE — HC
[facility]
Elida Garza
[place]   [address]                     CONSULTATION                  
_______________________________________________________________________________
 
Name:       JO ANN ESPOSITO                Room #:         359-P       ADM IN  
M.R.#:      [id number]                       Account #:      [id number]  
Admission:  03/10/20    Attend Phys:    Vaughn Botello MD    
Discharge:              Date of Birth:  [birth year]  
                                                          Report #: [id number]
                                                                    [id number]   
_______________________________________________________________________________
THIS REPORT FOR:  
 
cc:  Mariano Vega James A. DO Al-Absi, Ahmed I. MD                                          ~
CC: Vaughn Chambers
 
DATE OF SERVICE:  03/12/2020
 
 
REASON FOR CONSULTATION:  Acute kidney injury.
 
REASON FOR PRESENTATION:  Shortness of breath.
 
HISTORY OF PRESENT ILLNESS:  A 50-year-old with history of diabetes mellitus and
diabetic retinopathy.  He also has history of osteomyelitis and diabetic
neuropathy in the past.  He has history of DVT and is maintained on chronic
anticoagulation.  He presented with nausea, vomiting, shortness of breath and
right-sided chest pain.  He was admitted for further evaluation and management. 
Blood sugar has been on the high side.  He sees wound care people.  He also not
aware of any previous kidney problems by his primary care physician; however,
looking through the patient's creatinine does look like that he has an elevated
creatinine back in February of this year.  We have never evaluated this patient
prior to his presentation.  He admits to poorly controlled diabetes mellitus. 
He also tells me that he has been taking nonsteroidal anti-inflammatory
medications on a daily basis.  He reported to bilateral lower extremity
swelling, but this has not been changed.  He previously required daptomycin for
his wound infections.  He is maintained on furosemide as an outpatient.  He also
was taking daily ibuprofen.
 
PAST MEDICAL HISTORY:
1.  Diabetes mellitus.
2.  Diabetic complications including amputations due to ulcer and osteomyelitis.
3.  Deep venous thrombosis.
4.  Pulmonary embolism.
5.  Status post left lower extremity metatarsal amputation.
6.  Right ankle reconstruction surgery.
7.  Remote history of acute kidney failure requiring temporary hemodialysis. 
The patient's details are not available.
8.  Pulmonary hypertension.
 
ALLERGIES:  None.
 
SOCIAL HISTORY:  He denies drug or alcohol abuse.
 
 
 
[facility]
[address]   [address]                     CONSULTATION                  
_______________________________________________________________________________
 
Name:       JO ANN ESPOSITO                Room #:         359-P       [facility] [place]  
M.R.#:      [id number]                       Account #:      [id number]  
Admission:  03/10/20    Attend Phys:    Vaughn Botello MD    
Discharge:              Date of Birth:  [birth year]  
                                                          Report #: [id number]
                                                                    [id number]   
_______________________________________________________________________________
 
FAMILY HISTORY:  Significant for diabetes mellitus and hypertension.
 
SOCIAL HISTORY:  .  Lives by himself.  He is an  for a [employer].
 
REVIEW OF SYSTEMS:
GENERAL:  No fever or chills.
CARDIOVASCULAR:  No chest pain, but he has significant shortness of breath.
PULMONARY:  No cough or hemoptysis, but he has significant shortness of breath.
GASTROINTESTINAL:  Occasional nausea.
GENITOURINARY:  No frequency, no urgency.
MUSCULOSKELETAL:  Occasional myalgias.
 
HOME MEDICATIONS:
1.  Eliquis.
2.  Diltiazem.
3.  Gabapentin.
4.  Furosemide.
5.  Cefazolin.
6.  Jardiance.
 
PHYSICAL EXAMINATION:
NEUROLOGICAL:  He is alert, oriented.
VITAL SIGNS:  Temperature is 37.2, blood pressure is marginal at 103/61.
HEAD AND NECK:  No jugular venous distention.
CHEST:  No crackles.
CARDIOVASCULAR:  No rub.
ABDOMEN:  Soft, nontender.
EXTREMITIES:  Lower extremities prior amputations.  No edema.
 
LABORATORY DATA:  Reviewed.  White blood cell count is 7.1, hemoglobin is 12,
platelet is 52.  Sodium is 136, potassium is 4.3, BUN is 53, creatinine is 2.1.
 
ASSESSMENT, IMPRESSION AND PLAN:
1.  Acute kidney injury due to hypotension.
2.  Diabetes mellitus.
3.  All diabetic complications including retinopathy, nephropathy, neuropathy.
4.  Deep venous thrombosis.
5.  Pulmonary embolism.
6.  Previously required hemodialysis.
7.  The patient's acute kidney injury is related to hypotension.
8.  Discontinue diuresis.
9.  Initiate IV fluid.
10.  Obtain basic workup.
11.  No blood pressure medications at this point.
 
 
 
[place] [id number]                     CONSULTATION                  
_______________________________________________________________________________
 
Name:       JO ANN ESPOSITO                Room #:         359-P       [facility] [place]  
.R.#:      [id number]                       Account #:      [id number]  
Admission:  03/10/20    Attend Phys:    Vaughn Botello MD    
Discharge:              Date of Birth:  [birth year]  
                                                          Report #: [id number]
                                                                    [id number]   
_______________________________________________________________________________
12.  Discontinue all nonsteroidal anti-inflammatory medications.
13.  Management of his diabetes mellitus as per the primary team.
 
 
 
 
 
 
 
 
 
 
 
 
 
 
 
 
 
 
 
 
 
 
 
 
 
 
 
 
 
 
 
 
 
 
 
 
 
 
 
 
 
 
  <ELECTRONICALLY SIGNED>
   By: Aide Wiley MD          
  03/13/20     0829
D: 03/12/20 0733                           _____________________________________
T: 03/12/20 0752                           Aide Wiley MD            /nt

## 2020-03-13 NOTE — NUR
ASSUMED CARE AT 1900. PT C/O RIGHT FLANK, STERNAL, AND LEFT FOOT PAIN ALL AT
9/10; REPORTED PAIN LEVEL DROPPED TO 7 WITH IV MORPHINE. PT REPORTS BEING
ANXIOUS ABOUT HIS SITUATION-STARTED ON ATIVAN. DENIES SOB EXCEPT WHEN HIS
ANXIETY WORSENS OR WITH A LOT OF ACTIVITY; TOLERATED BIPAP FOR A FEW HOURS
OVERNIGHT THEN ASKED FOR THE NC FOR COMFORT. ABOUT 0130, PT CALLED OUT C/O
WORSENING RIGHT FLANK PAIN; OBTAINED ONE TIME ORDER FOR NORCO WHICH PT
REPORTED DIDN'T HELP MUCH; GAVE AN ICE PACK AND ALSO A DOSE OF IV ATIVAN. PT
NO LONGER TEARFUL AND RESTLESS BUT STILL C/O PAIN. PICC NURSE ADJUSTED THE
LENGTH OF THE PICC LINE AND REDRESSED THE SITE. NO OTHER CONCERNS, WILL
CONTINUE TO MONITOR.

## 2020-03-14 VITALS — DIASTOLIC BLOOD PRESSURE: 80 MMHG | SYSTOLIC BLOOD PRESSURE: 120 MMHG

## 2020-03-14 VITALS — SYSTOLIC BLOOD PRESSURE: 151 MMHG | DIASTOLIC BLOOD PRESSURE: 90 MMHG

## 2020-03-14 VITALS — SYSTOLIC BLOOD PRESSURE: 139 MMHG | DIASTOLIC BLOOD PRESSURE: 83 MMHG

## 2020-03-14 VITALS — SYSTOLIC BLOOD PRESSURE: 135 MMHG | DIASTOLIC BLOOD PRESSURE: 82 MMHG

## 2020-03-14 VITALS — SYSTOLIC BLOOD PRESSURE: 133 MMHG | DIASTOLIC BLOOD PRESSURE: 79 MMHG

## 2020-03-14 LAB
ANION GAP SERPL CALC-SCNC: 9 MMOL/L (ref 7–16)
BUN SERPL-MCNC: 29 MG/DL (ref 7–18)
CALCIUM SERPL-MCNC: 8.5 MG/DL (ref 8.5–10.1)
CHLORIDE SERPL-SCNC: 107 MMOL/L (ref 98–107)
CO2 SERPL-SCNC: 25 MMOL/L (ref 21–32)
CREAT SERPL-MCNC: 1.2 MG/DL (ref 0.7–1.3)
ERYTHROCYTE [DISTWIDTH] IN BLOOD BY AUTOMATED COUNT: 17.5 % (ref 10.5–14.5)
GLUCOSE SERPL-MCNC: 224 MG/DL (ref 74–106)
HCT VFR BLD CALC: 35.9 % (ref 42–52)
HGB BLD-MCNC: 11.4 GM/DL (ref 14–18)
MCH RBC QN AUTO: 27.8 PG (ref 26–34)
MCHC RBC AUTO-ENTMCNC: 31.9 G/DL (ref 28–37)
MCV RBC: 87.3 FL (ref 80–100)
PLATELET # BLD: 116 THOU/UL (ref 150–400)
POTASSIUM SERPL-SCNC: 4.7 MMOL/L (ref 3.5–5.1)
RBC # BLD AUTO: 4.11 MIL/UL (ref 4.5–6)
SODIUM SERPL-SCNC: 141 MMOL/L (ref 136–145)
WBC # BLD AUTO: 5 THOU/UL (ref 4–11)

## 2020-03-14 PROCEDURE — 5A09357 ASSISTANCE WITH RESPIRATORY VENTILATION, LESS THAN 24 CONSECUTIVE HOURS, CONTINUOUS POSITIVE AIRWAY PRESSURE: ICD-10-PCS | Performed by: HOSPITALIST

## 2020-03-14 NOTE — HC
[facility]
Elida Garza
[place]   [address]                     CONSULTATION                  
_______________________________________________________________________________
 
Name:       JO ANN ESPOSITO                Room #:         359-P       ADM IN  
M.R.#:      [id number]                       Account #:      [id number]  
Admission:  03/10/20    Attend Phys:    Vaughn Botello MD    
Discharge:              Date of Birth:  [birth year]  
                                                          Report #: [id number]
                                                                    [id number]   
_______________________________________________________________________________
THIS REPORT FOR:  
 
cc:  Mariano Vega James A. DO Geha, Daniel J. MD                                            ~
CC: Vaughn Chambers
 
DATE OF SERVICE:  03/12/2020
 
 
INFECTIOUS DISEASE CONSULTATION
 
REASON FOR CONSULTATION:  I was asked to evaluate concerning left third
metatarsal osteomyelitis and respiratory compromise.
 
HISTORY OF PRESENT ILLNESS:  The patient is a 50-year-old with diabetes,
obesity, peripheral vascular disease, left third methicillin-susceptible Staph
aureus osteomyelitis, status post metatarsal head resection and undergoing IV
antibiotic therapy with cefazolin now 6 weeks into his treatment course,
hospitalized through last week for acute respiratory distress.  He was found to
have evidence of mucus plugging with negative chest x-ray and CT scan.  Had
large mucus plug in his main stem and right bronchial tree.  Also had what was
considered reflux edema during that procedure bronchoscopy.  Cultures grew from
3 different specimens Klebsiella pneumoniae that was multidrug-resistant
sensitive to tetracycline, amikacin and have Avycaz.  These results were not
available at the time of his discharge or hospitalization.  He was discharged on
cefazolin to finish his course of antibiotics for his foot.  He returned to the
hospital again with shortness of breath prior to his followup visit in the
outpatient ID clinic.  He has had no fever, chills or sweats.  Oxygen saturation
varies.  He was desaturating at the time of his presentation.  Now he is off
oxygen.  Overall, feels a bit better, but has not walked yet.  He notes that his
symptoms returned within 24 hours of his dismissal last admit.  Denies any
fever, chills or sweats.  He is unable to expectorate.  No significant
peripheral edema.  He has not lost any weight yet.  Blood sugar control has been
reasonable.
 
No other cardiac, GI or  complaints.  He has a left upper extremity PICC,
which is functioning well.  A 14-point review of systems was  negative other
than what has been described above.  His wound VAC is now off and he is doing
daily dressing changes.
 
ALLERGIES:  None known.
 
MEDICATIONS:  As noted on his MAR including cefazolin.
 
 
 
[address]   [address]                     CONSULTATION                  
_______________________________________________________________________________
 
Name:       JO ANN ESPOSITO                Room #:         359-P       ADM IN  
.R.#:      [id number]                       Account #:      [id number]  
Admission:  03/10/20    Attend Phys:    Vaughn Botello MD    
Discharge:              Date of Birth:  [birth year]  
                                                          Report #: [id number]
                                                                    [id number]   
_______________________________________________________________________________
 
PAST MEDICAL HISTORY,  FAMILY HISTORY, AND  SOCIAL HISTORY:  Unchanged from his
history and physical and that of my previous consultation from last week.
 
PHYSICAL EXAMINATION:
VITAL SIGNS:  He was afebrile and hemodynamically stable.
GENERAL:  He is alert and cooperative, in no acute distress.
EXTREMITIES:  Left upper extremity PICC was being changed and the patient is in
drape for sterile dressing change.  He was on room air and comfortable lying in
bed.  Left foot was dressed and dry.
 
LABORATORY STUDIES:  Reviewed.  Microbiology reviewed.  Chest x-ray was reviewed
and clear.
 
IMPRESSION:
1.  A 50-year-old with recurrent shortness of breath.  Workup in progress noting
now he had a multidrug-resistant Klebsiella pneumoniae, lower respiratory tract
infection identified from bronchoscopy 10 days ago.  He had tracheobronchial
mucus plugging identified at bronchoscopy.  Imaging studies had not detected
this abnormality.  I am questioning whether this is underlying problem.  It
appeared that he improved after bronchoscopy, only to have this same symptom
complex developed within a day after dismissal.  Finishing his course of therapy
for methicillin-susceptible Staph aureus osteomyelitis of the third metatarsal.
2.  Diabetes.
3.  Morbid obesity.
4.  Obstructive sleep apnea.
5.  Peripheral vascular disease.
6.  Acute kidney injury.
7.  Hypertension.
 
RECOMMENDATIONS:  We will continue with antibiotic therapy with Avycaz and that
should allow us good coverage for his pulmonary infection as well as continue
coverage for his foot.  We will continue pulmonary evaluation and discuss
further with Pulmonary Medicine.  Continue with mucolytics and hydration. 
Pulmonary toilet.  We will finish his course of antibiotics for his
osteomyelitis at the time of discharge.
 
 
 
 
 
 
 
 
  <ELECTRONICALLY SIGNED>
   By: Terry Chavez MD            
  03/14/20     1251
D: 03/12/20 2226                           _____________________________________
T: 03/12/20 2244                           Terry Chavez MD              /nt

## 2020-03-14 NOTE — NUR
SHORTLY AFTER SHIFT CHANGE NURSE HAD A CONVERSATION WITH PATIENT REGARDING HIS
ANXIETY AND PAIN. PATIENT STATED THAT "DOES NOT WANT" ATIVAN BECAUSE HE DID
NOT LIKE THE WAY IT MADE HIM FEEL "DROWSY". NURSE LISTENED TO PATIENTS
COMPLAINTS AND CONTACTED PROVIDER TO CHANGE MEDICATION TO VISTARIL IN HOPE OF
BETTER REACTION FOR PATIENT. SHORTLY AFTERWARD PATIENTS GIRLFRIEND CONTACTED
NURSE AND WAS IRATE ABOUT NOT BEING LET UP TO PATIENTS ROOM DUE TO NEW
HOSPITAL GUIDELINES, AND PATIENT NOT RECEIVING HIS LORAZEPAM. NURSE TRIED
MULTIPLE TIMES TO EXPLAIN TO HER THAT PATIENT IS FULLY ALERT AND ORIENTED AND
HAS RIGHTS CONCERNING THE MEDICATIONS HE RECEIVES. SHE STATED THAT SHE "NEEDS"
TO BE AT BEDSIDE "TO COMMUNICATE FOR HIM".

## 2020-03-14 NOTE — NUR
PT SITTING UP IN LOUNGE CHAIR, CALLED FOR ASSIST TO AMBULATE TO RESTROOM AND
BED. PT REQUESTED PRN FOR FLANK PAIN X1. PT REQUESTED HS SNACK. BLUNTED
AFFECT, POOR EYE CONTACT. PT OBESE GENERALIZED EDEMA. PICC LINE INTACT. PT
REQUESTED SCDS AND PLACED ON. L FOOT DRESSING INTACT DRY. CPAP HS.

## 2020-03-14 NOTE — NUR
SIGNIFICANT OTHER AT BEDSIDE THIS SHIFT. VOICED CONCERNS OVER PAIN MANAGEMENT,
MEDICATIONS, AND DESIRE TO SPEAK WITH PROVIDER REGARDING PLAN OF CARE.
SIGNIFICANT OTHER SAT WITH HOSPITALIST TO DISCUSS PATIENT CAR AND MEDICATION.
PO PAIN MEDS STARTED THIS SHIFT. PATIENT REPORTS MINIMAL PAIN RELIEF. PROVIDER
NOTIFIED, RECEIVED NEW ORDERS. FALL PRECAUTIONS IN PLACE. CALLS FOR ASSIST.
REMAINED ON ROOM AIR THIS SHIFT, NO S/S OF RESP DISTRESS. REPORTS GIVEN TO
TOMAS LAROSE AT 1630.

## 2020-03-15 VITALS — SYSTOLIC BLOOD PRESSURE: 149 MMHG | DIASTOLIC BLOOD PRESSURE: 81 MMHG

## 2020-03-15 VITALS — SYSTOLIC BLOOD PRESSURE: 134 MMHG | DIASTOLIC BLOOD PRESSURE: 77 MMHG

## 2020-03-15 VITALS — SYSTOLIC BLOOD PRESSURE: 155 MMHG | DIASTOLIC BLOOD PRESSURE: 85 MMHG

## 2020-03-15 VITALS — SYSTOLIC BLOOD PRESSURE: 133 MMHG | DIASTOLIC BLOOD PRESSURE: 50 MMHG

## 2020-03-15 NOTE — NUR
PATIENT REPORTS PAIN AS PARTIALLY MANAGED. REPORTS THAT PAIN NEVER DECREASES
BELOW 7/10. PATIENT WALKED IN ROOM THIS SHIFT WITH STB ASSIST. NO BOWEL
MOVEMENT THIS SHIFT, DENIES FEELING CONSTIPATED. ISOLATION MAINTAINED THIS
SHIFT. FALL PRECAUTIONS IN PLACE. TRADJENTA STARTED THIS SHIFT AS ORDERED.

## 2020-03-16 VITALS — SYSTOLIC BLOOD PRESSURE: 151 MMHG | DIASTOLIC BLOOD PRESSURE: 92 MMHG

## 2020-03-16 VITALS — SYSTOLIC BLOOD PRESSURE: 179 MMHG | DIASTOLIC BLOOD PRESSURE: 94 MMHG

## 2020-03-16 VITALS — DIASTOLIC BLOOD PRESSURE: 79 MMHG | SYSTOLIC BLOOD PRESSURE: 135 MMHG

## 2020-03-16 VITALS — SYSTOLIC BLOOD PRESSURE: 134 MMHG | DIASTOLIC BLOOD PRESSURE: 77 MMHG

## 2020-03-16 VITALS — DIASTOLIC BLOOD PRESSURE: 73 MMHG | SYSTOLIC BLOOD PRESSURE: 134 MMHG

## 2020-03-16 NOTE — NUR
Pt progressing towards dic goals. Tolerated cpap well. Sats WNL. Lungs clear
but diminished and umlabored. HRR. Bp moderately elevated 159/76 this am. Pain
meds given for c/o left flank pain 9/10 and left foot pain. Encouraged pt to
save urine for accurate I&O. Left foot dressing remains clean dry and intact.

## 2020-03-16 NOTE — NUR
SW reviewed chart and spoke with nursing and attending physician. Pt is slowly
progressing towards goals for discharge. 5N consult ordered today to evaluate
pt for inpt acute rehab. SW updated Geovanna with [facility]. Pt has been on
service with [facility] for IV abx, [facility] for HH and [facility] for
wound vac. Awaiting input from 5N. SW is following to assist as needed with
discharge planning.

## 2020-03-16 NOTE — NUR
PT CARE ASSUMED AT 0700, PT ALERT AND ORIENTED X4, DENIES NAUSEA, VOMITING AND
CHEST PAIN. PT COMPLAINS OF LEFT FOOT AND LOWER BACK PAIN. PT MEDICATED PER
ORDER. PT UP IN CHAIR. CALL LIGHT ABD TABLKE IN REACH. PT DENIES ANY NEEDS.
WILL CONTINUE TO MONITOR.

## 2020-03-17 VITALS — SYSTOLIC BLOOD PRESSURE: 125 MMHG | DIASTOLIC BLOOD PRESSURE: 73 MMHG

## 2020-03-17 VITALS — SYSTOLIC BLOOD PRESSURE: 120 MMHG | DIASTOLIC BLOOD PRESSURE: 68 MMHG

## 2020-03-17 VITALS — SYSTOLIC BLOOD PRESSURE: 144 MMHG | DIASTOLIC BLOOD PRESSURE: 89 MMHG

## 2020-03-17 VITALS — SYSTOLIC BLOOD PRESSURE: 119 MMHG | DIASTOLIC BLOOD PRESSURE: 69 MMHG

## 2020-03-17 NOTE — NUR
PT CARE ASSUMED AT 0700, PT ALERT AND ORIENTED X4, DENIES ANY NAUSEA, VOMITING
NOR CHEST PAIN. PT COMPLAINS OF BACK PAIN, MEDICATED PER ORDER. NO SIGNS OF
DISTRESS NOTED. ASSESSMENT COMPLETED. PT UPDATED ON CARE AND ABOUT DISCHARGE.
DISCHARGE IN PROCESS. PT UP IN CHAIR . CALL LIGHT IN AND TABLE IN REACH.

## 2020-03-17 NOTE — NUR
DISCHARGE NOTE:
BANDAR reviewed chart and spoke with nursing and attending physician. 5N
evaluated pt and he is too high level for inpt acute rehab. Pt is medically
stable for discharge home today. Pt will discharge home with  services for
wound care. Pt will be on PO abx. PICC line to be pulled prior to discharge.
BANDAR notified Geovanna at [facility] that pt will not need home IV abx.  BANDAR faxed
finalized discharge orders/summary to [facility] . BANDAR notified HH
liaison of discharge orders. BANDAR spoke with Kalie in intake to confirm
orders were received. Contact info for HH placed in pt's discharge summary.
Pt's family to provide transportation home. No additional SW needs identified
at this time, but is available to assist should needs arise.

## 2020-03-17 NOTE — NUR
DRESSING CHANGE COMPLETED, AND PICTURE TAKEN. PT DISCHARGE INSTRUCTION AND
DOCTORS APPOINTMENT GIVEN TO PT. PICC LINE TAKEN OUT PER DR FONG ORDERS. PT
BEONGINGS PACKED. WAITING FOR PT'S RIDE

## 2020-03-17 NOTE — NUR
Nutrition: Assessed due to LOS. Admit: acute respiratory failure, pneumonia,
CHF. Hx: DM II, HTN, PVD, PE, diabetic L toe ulcer. Pt w/ ongoing surgical
wound to L foot of 3rd metatarsal. Hx osteomyelitis indicated in EMR, on 6 wks
IV antibiotic treatment. On a heart healthy diet given mention of acute on
chronic diastolic heart failure. Diuresing w/ torsemide. At visit, pt denies
any nutrition questions or concerns. Eating very well. RD familiar to this pt
from past admits. Already educated on rich protein sources and nutrient needs
to support healing. Pt admits to doing excellent w/ protein intake, ate 100%
all meals on 3/16. BGs 163-180 mg/dl yesterday, w/ SSI and Tradjenta. A1c 7.6%
per 2/29 labs. RD educated on rationale for low Na diet given CHF and informed
of high Na foods to use caution with, ideally avoid. Wt fluctuates mid 340s to
mid 350s this admit, but overall, pt is down 18# from a few mo ago.
1/28/20: 375#   2/25/20: 357#   3/15/20: 356#  No further nutrition needs.
Encouraged pt to notify nursing if additional diet ed needed. Low risk.

## 2020-03-17 NOTE — NUR
PATIENT AOX4 MAKES NEEDS KNOWN. PAIN CONTROLLED THIS SHIFT. PATIENT DRESSING
ON LEFT FOOT IS C/D/I. PATIENT AMBULATES WITH STEADY GAITS. PATIENT IS UP AT
[facility]. CALL LIGHT AND PERSONAL ITEM WITHIN REACH. PATIENT IN BED ASLEEP AT THIS
TIME BREATHING REGULAR AND UNLABOURED.

## 2020-03-19 NOTE — NUR
PATIENT ASSESSED AND IS ALERT X 4. UP AD [facility] IN ROOM. ENCOURGED TO WALK
MORE.LEFT FOOT DRESSING DONE AS ORDERED AS A WET TO DRY AND REWRAPPED. 02 AT
2LNC. DENIES ANY SOA. NO SHORTNESS OF BREATH STATED. LEFT UPPER ARM PICC
FLUSHES WELL. HAS NOT BEEN ON ELMA MASK ALL NIGHT.IV SITE HEALTHY. ANTIBIOTIC
APPLIESD AS ORFERED. PAIN MEDICATION GIVEN Q 4 HORS AS NEEDED, SLEPT IN
BETWEEN CARES. CONT PLAN OF CARE. Patient was seen by another provider at [facility].  Referral is being removed.

## 2020-03-24 ENCOUNTER — HOSPITAL ENCOUNTER (OUTPATIENT)
Dept: HOSPITAL 35 - HYPER | Age: 50
End: 2020-03-24
Attending: PREVENTIVE MEDICINE
Payer: COMMERCIAL

## 2020-03-24 DIAGNOSIS — R60.0: ICD-10-CM

## 2020-03-24 DIAGNOSIS — E11.621: ICD-10-CM

## 2020-03-24 DIAGNOSIS — M86.372: ICD-10-CM
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
## 2020-03-24 DIAGNOSIS — T81.31XD: Primary | ICD-10-CM

## 2020-03-24 DIAGNOSIS — E44.0: ICD-10-CM

## 2020-03-24 DIAGNOSIS — N19: ICD-10-CM

## 2020-03-24 DIAGNOSIS — L97.522: ICD-10-CM

## 2020-03-24 DIAGNOSIS — Z89.422: ICD-10-CM

## 2020-03-24 DIAGNOSIS — E11.69: ICD-10-CM

## 2020-03-24 DIAGNOSIS — Z86.718: ICD-10-CM

## 2020-03-24 DIAGNOSIS — E11.40: ICD-10-CM

## 2020-03-26 ENCOUNTER — HOSPITAL ENCOUNTER (OUTPATIENT)
Dept: HOSPITAL 35 - SLEEPLAB | Age: 50
End: 2020-03-26
Attending: INTERNAL MEDICINE
Payer: COMMERCIAL

## 2020-03-26 DIAGNOSIS — R09.02: ICD-10-CM

## 2020-03-26 DIAGNOSIS — G47.33: Primary | ICD-10-CM

## 2020-03-29 NOTE — SLE
[facility]
[address]
[place]   [address]                     POLYSOMNOGRAPHY STUDY         
_______________________________________________________________________________
 
Name:       JO ANN ESPOSITO                Room #:                     REG [facility].#:      [id number]                       Account #:      [id number]  
Admission:  03/26/20    Attend Phys:    Guanaco Chambers MD   
Discharge:              Date of Birth:  [birth year]  
                                                          Report #: [id number]
                                                                    [id number]   
_______________________________________________________________________________
THIS REPORT FOR:   //name//                          
 
CC: Mariano Chambers MD
 
DATE OF SERVICE:  03/26/2020
 
 
SLEEP STUDY
 
ATTENDING PHYSICIAN:  Dr. Guanaco Chambers.
 
The patient is a 50-year-old who weighs 353 pounds with a BMI of 46.6.  The
patient's [place] score was 15.  The patient underwent a diagnostic sleep study
performed at [facility].
 
During the night study, the patient spent 520 minutes in bed and slept for 501
minutes with a sleep efficiency of 96%.  Sleep latency was 2.2 minutes, which
was short with a REM latency of 281 minutes.  Sleep architecture showed normal
stage 1 sleep, increased stage 2 sleep, absent slow wave and reduced REM sleep,
which was 10% of total sleep time.
 
During the night study, the patient had 33 obstructive apneas, 1 mixed apnea and
52 central apneas.  The patient's AHI was 19.7 per hour with a REM AHI of 57 per
hour and a supine AHI of 19.3 per hour.
 
EKG monitoring revealed an average heart rate of 86 beats per minute.  No
sustained arrhythmias observed.
 
PLMS were seen at an index of 58 per hour and 2.7 per hour caused EEG arousals.
 
Nocturnal oximetry study revealed an average oxygen saturation of 94% with
lowest of 70%.  A 26.8 minutes were spent in oxygen saturation less than 89%.
 
IMPRESSION:
1.  Moderate Sleep apnea (AHI 19.7/hr) with worsening during REM sleep (57/hr)
Patient's sleep apnea was complexed including combination of obstructive
and central apneas.
2.  Nocturnal hypoxia secondary to obstructive sleep apnea.
3.  Severe PLMS without any significant EEG arousals.  The patient's PLM index
was 58 per hour with an arousal index of 2.7 per hour.
 
RECOMMENDATIONS:
1.  The patient would benefit from treatment of sleep apnea with in-lab CPAP
titration study due to presence of complex sleep apnea. Alternate treatment
 
 
 
[facility]
[address]
[place]   [address]                     POLYSOMNOGRAPHY STUDY         
_______________________________________________________________________________
 
Name:       JO ANN ESPOSITO                Room #:                     REG [facility] 
CARLO#:      [id number]                       Account #:      [id number]  
Admission:  03/26/20    Attend Phys:    Guanaco Chambers MD   
Discharge:              Date of Birth:  [birth year]  
                                                          Report #: [id number]
                                                                    [id number]   
_______________________________________________________________________________
option would include home Auto- Pap titration study.
2.  Once the patient is optimally treated, then follow up in 4-6 weeks to assess
compliance with CPAP and to document clinical improvement.
3.  Weight loss is strongly advised.
4.  Avoid CNS depressants.
5.  Cautioned regarding driving until symptoms of sleep apnea resolved with the
use of CPAP.
6.  The patient's PLMS does not need to be treated unless the patient has
symptoms of restless legs during the day.
 
 
 
 
 
 
 
 
 
 
 
 
 
 
 
 
 
 
 
 
 
 
 
 
 
 
 
 
 
 
 
 
 
 
 
  <ELECTRONICALLY SIGNED>
   By: Rudy Patel MD              
  03/29/20     2112
D: 03/29/20 1746                           _____________________________________
T: 03/29/20 1805                           Rudy Patel MD                /nt

## 2020-03-30 ENCOUNTER — HOSPITAL ENCOUNTER (OUTPATIENT)
Dept: HOSPITAL 35 - RAD | Age: 50
End: 2020-03-30
Attending: SPECIALIST
Payer: COMMERCIAL

## 2020-03-30 DIAGNOSIS — J22: Primary | ICD-10-CM

## 2020-04-07 ENCOUNTER — HOSPITAL ENCOUNTER (OUTPATIENT)
Dept: HOSPITAL 35 - HYPER | Age: 50
End: 2020-04-07
Attending: PREVENTIVE MEDICINE
Payer: COMMERCIAL

## 2020-04-07 DIAGNOSIS — Z86.718: ICD-10-CM

## 2020-04-07 DIAGNOSIS — T81.31XD: Primary | ICD-10-CM

## 2020-04-07 DIAGNOSIS — E11.40: ICD-10-CM

## 2020-04-07 DIAGNOSIS — R60.0: ICD-10-CM

## 2020-04-07 DIAGNOSIS — E11.621: ICD-10-CM

## 2020-04-07 DIAGNOSIS — E11.69: ICD-10-CM

## 2020-04-07 DIAGNOSIS — L84: ICD-10-CM

## 2020-04-07 DIAGNOSIS — E44.0: ICD-10-CM

## 2020-04-07 DIAGNOSIS — L97.522: ICD-10-CM

## 2020-04-07 DIAGNOSIS — Z89.422: ICD-10-CM

## 2020-04-07 DIAGNOSIS — M86.372: ICD-10-CM

## 2020-04-07 DIAGNOSIS — Z86.14: ICD-10-CM
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
## 2020-04-07 DIAGNOSIS — Z79.82: ICD-10-CM

## 2020-04-07 DIAGNOSIS — Z79.84: ICD-10-CM

## 2020-04-07 DIAGNOSIS — Z86.711: ICD-10-CM

## 2020-04-14 ENCOUNTER — HOSPITAL ENCOUNTER (OUTPATIENT)
Dept: HOSPITAL 35 - HYPER | Age: 50
End: 2020-04-14
Attending: PREVENTIVE MEDICINE
Payer: COMMERCIAL

## 2020-04-14 DIAGNOSIS — E44.0: ICD-10-CM

## 2020-04-14 DIAGNOSIS — Z79.84: ICD-10-CM

## 2020-04-14 DIAGNOSIS — T87.81: Primary | ICD-10-CM
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
## 2020-04-14 DIAGNOSIS — E11.621: ICD-10-CM

## 2020-04-14 DIAGNOSIS — E11.69: ICD-10-CM

## 2020-04-14 DIAGNOSIS — E11.40: ICD-10-CM

## 2020-04-14 DIAGNOSIS — Z86.14: ICD-10-CM

## 2020-04-14 DIAGNOSIS — Z86.711: ICD-10-CM

## 2020-04-14 DIAGNOSIS — Z86.718: ICD-10-CM

## 2020-04-14 DIAGNOSIS — M86.372: ICD-10-CM

## 2020-04-14 DIAGNOSIS — L97.522: ICD-10-CM

## 2020-05-07 ENCOUNTER — HOSPITAL ENCOUNTER (OUTPATIENT)
Dept: HOSPITAL 35 - HYPER | Age: 50
End: 2020-05-07
Attending: PREVENTIVE MEDICINE
Payer: COMMERCIAL

## 2020-05-07 DIAGNOSIS — Z86.711: ICD-10-CM

## 2020-05-07 DIAGNOSIS — M86.372: ICD-10-CM

## 2020-05-07 DIAGNOSIS — R60.0: ICD-10-CM

## 2020-05-07 DIAGNOSIS — Z89.422: ICD-10-CM

## 2020-05-07 DIAGNOSIS — Z79.84: ICD-10-CM

## 2020-05-07 DIAGNOSIS — Z86.718: ICD-10-CM

## 2020-05-07 DIAGNOSIS — E44.0: ICD-10-CM

## 2020-05-07 DIAGNOSIS — E11.69: ICD-10-CM

## 2020-05-07 DIAGNOSIS — E11.621: Primary | ICD-10-CM

## 2020-05-07 DIAGNOSIS — L97.522: ICD-10-CM

## 2020-05-07 DIAGNOSIS — L97.512: ICD-10-CM

## 2020-05-07 DIAGNOSIS — L84: ICD-10-CM

## 2020-05-07 DIAGNOSIS — E11.40: ICD-10-CM

## 2020-05-14 ENCOUNTER — HOSPITAL ENCOUNTER (OUTPATIENT)
Dept: HOSPITAL 35 - HYPER | Age: 50
End: 2020-05-14
Attending: PREVENTIVE MEDICINE
Payer: COMMERCIAL

## 2020-05-14 DIAGNOSIS — R60.0: ICD-10-CM

## 2020-05-14 DIAGNOSIS — L97.522: ICD-10-CM

## 2020-05-14 DIAGNOSIS — E44.0: ICD-10-CM

## 2020-05-14 DIAGNOSIS — L84: ICD-10-CM

## 2020-05-14 DIAGNOSIS — E11.621: ICD-10-CM
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
## 2020-05-14 DIAGNOSIS — L97.512: ICD-10-CM

## 2020-05-14 DIAGNOSIS — E11.69: ICD-10-CM

## 2020-05-14 DIAGNOSIS — Z86.718: ICD-10-CM

## 2020-05-14 DIAGNOSIS — E11.40: ICD-10-CM

## 2020-05-14 DIAGNOSIS — T81.31XD: Primary | ICD-10-CM

## 2020-05-14 DIAGNOSIS — Z79.84: ICD-10-CM

## 2020-05-14 DIAGNOSIS — M86.372: ICD-10-CM

## 2020-05-14 DIAGNOSIS — Z89.422: ICD-10-CM

## 2020-05-14 DIAGNOSIS — Z86.711: ICD-10-CM

## 2020-05-28 ENCOUNTER — HOSPITAL ENCOUNTER (OUTPATIENT)
Dept: HOSPITAL 35 - HYPER | Age: 50
End: 2020-05-28
Attending: PREVENTIVE MEDICINE
Payer: COMMERCIAL

## 2020-05-28 DIAGNOSIS — M86.372: ICD-10-CM

## 2020-05-28 DIAGNOSIS — L97.522: ICD-10-CM
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
## 2020-05-28 DIAGNOSIS — Z86.718: ICD-10-CM

## 2020-05-28 DIAGNOSIS — L84: ICD-10-CM

## 2020-05-28 DIAGNOSIS — Z86.711: ICD-10-CM

## 2020-05-28 DIAGNOSIS — L97.512: ICD-10-CM

## 2020-05-28 DIAGNOSIS — E11.621: ICD-10-CM

## 2020-05-28 DIAGNOSIS — R60.0: ICD-10-CM

## 2020-05-28 DIAGNOSIS — T81.31XD: Primary | ICD-10-CM

## 2020-05-28 DIAGNOSIS — E44.0: ICD-10-CM

## 2020-05-28 DIAGNOSIS — Z79.84: ICD-10-CM

## 2020-05-28 DIAGNOSIS — E11.40: ICD-10-CM

## 2020-05-28 DIAGNOSIS — E11.69: ICD-10-CM

## 2020-05-28 DIAGNOSIS — Z89.422: ICD-10-CM

## 2020-07-01 ENCOUNTER — HOSPITAL ENCOUNTER (OUTPATIENT)
Dept: HOSPITAL 35 - HYPER | Age: 50
End: 2020-07-01
Attending: EMERGENCY MEDICINE
Payer: COMMERCIAL

## 2020-07-01 DIAGNOSIS — E11.69: ICD-10-CM
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
## 2020-07-01 DIAGNOSIS — L84: ICD-10-CM

## 2020-07-01 DIAGNOSIS — E11.40: ICD-10-CM

## 2020-07-01 DIAGNOSIS — Z86.711: ICD-10-CM

## 2020-07-01 DIAGNOSIS — Z86.718: ICD-10-CM

## 2020-07-01 DIAGNOSIS — E44.0: ICD-10-CM

## 2020-07-01 DIAGNOSIS — Z79.84: ICD-10-CM

## 2020-07-01 DIAGNOSIS — Z89.422: ICD-10-CM

## 2020-07-01 DIAGNOSIS — L97.512: ICD-10-CM

## 2020-07-01 DIAGNOSIS — E11.621: ICD-10-CM

## 2020-07-01 DIAGNOSIS — R60.0: ICD-10-CM

## 2020-07-01 DIAGNOSIS — T81.31XD: Primary | ICD-10-CM

## 2020-07-01 DIAGNOSIS — M86.372: ICD-10-CM

## 2020-07-15 ENCOUNTER — HOSPITAL ENCOUNTER (OUTPATIENT)
Dept: HOSPITAL 35 - HYPER | Age: 50
End: 2020-07-15
Attending: PREVENTIVE MEDICINE
Payer: COMMERCIAL

## 2020-07-15 DIAGNOSIS — Z89.422: ICD-10-CM

## 2020-07-15 DIAGNOSIS — T81.31XD: Primary | ICD-10-CM

## 2020-07-15 DIAGNOSIS — L84: ICD-10-CM

## 2020-07-15 DIAGNOSIS — M86.372: ICD-10-CM

## 2020-07-15 DIAGNOSIS — R60.0: ICD-10-CM

## 2020-07-15 DIAGNOSIS — Z86.711: ICD-10-CM

## 2020-07-15 DIAGNOSIS — E11.69: ICD-10-CM

## 2020-07-15 DIAGNOSIS — E11.40: ICD-10-CM

## 2020-07-15 DIAGNOSIS — E44.0: ICD-10-CM

## 2020-07-15 DIAGNOSIS — Z86.718: ICD-10-CM
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
## 2020-07-15 DIAGNOSIS — Z79.84: ICD-10-CM

## 2020-07-15 DIAGNOSIS — L97.512: ICD-10-CM

## 2020-07-15 DIAGNOSIS — E11.621: ICD-10-CM

## 2020-07-29 ENCOUNTER — HOSPITAL ENCOUNTER (OUTPATIENT)
Dept: HOSPITAL 35 - HYPER | Age: 50
End: 2020-07-29
Attending: PREVENTIVE MEDICINE
Payer: COMMERCIAL

## 2020-07-29 DIAGNOSIS — E11.40: ICD-10-CM
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
## 2020-07-29 DIAGNOSIS — R60.0: ICD-10-CM

## 2020-07-29 DIAGNOSIS — Z86.711: ICD-10-CM

## 2020-07-29 DIAGNOSIS — E44.0: ICD-10-CM

## 2020-07-29 DIAGNOSIS — L84: ICD-10-CM

## 2020-07-29 DIAGNOSIS — E11.621: ICD-10-CM

## 2020-07-29 DIAGNOSIS — E11.69: ICD-10-CM

## 2020-07-29 DIAGNOSIS — Z79.84: ICD-10-CM

## 2020-07-29 DIAGNOSIS — Z86.718: ICD-10-CM

## 2020-07-29 DIAGNOSIS — L97.512: ICD-10-CM

## 2020-07-29 DIAGNOSIS — M86.372: ICD-10-CM

## 2020-07-29 DIAGNOSIS — T81.31XD: Primary | ICD-10-CM

## 2020-07-29 DIAGNOSIS — E66.01: ICD-10-CM

## 2020-08-12 ENCOUNTER — HOSPITAL ENCOUNTER (OUTPATIENT)
Dept: HOSPITAL 35 - HYPER | Age: 50
End: 2020-08-12
Attending: EMERGENCY MEDICINE
Payer: COMMERCIAL

## 2020-08-12 DIAGNOSIS — L84: ICD-10-CM

## 2020-08-12 DIAGNOSIS — E44.0: ICD-10-CM

## 2020-08-12 DIAGNOSIS — L97.512: ICD-10-CM

## 2020-08-12 DIAGNOSIS — E11.69: ICD-10-CM

## 2020-08-12 DIAGNOSIS — Z86.718: ICD-10-CM

## 2020-08-12 DIAGNOSIS — R60.0: ICD-10-CM

## 2020-08-12 DIAGNOSIS — M86.372: ICD-10-CM

## 2020-08-12 DIAGNOSIS — E66.01: ICD-10-CM

## 2020-08-12 DIAGNOSIS — Z86.711: ICD-10-CM

## 2020-08-12 DIAGNOSIS — E11.621: ICD-10-CM

## 2020-08-12 DIAGNOSIS — Z79.84: ICD-10-CM

## 2020-08-12 DIAGNOSIS — T81.31XD: Primary | ICD-10-CM
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
## 2020-08-12 DIAGNOSIS — E11.40: ICD-10-CM

## 2020-08-17 ENCOUNTER — HOSPITAL ENCOUNTER (OUTPATIENT)
Dept: HOSPITAL 35 - HYPER | Age: 50
End: 2020-08-17
Attending: PREVENTIVE MEDICINE
Payer: COMMERCIAL

## 2020-08-17 DIAGNOSIS — L97.512: ICD-10-CM

## 2020-08-17 DIAGNOSIS — E11.40: ICD-10-CM

## 2020-08-17 DIAGNOSIS — R60.0: ICD-10-CM

## 2020-08-17 DIAGNOSIS — T81.31XD: Primary | ICD-10-CM

## 2020-08-17 DIAGNOSIS — L84: ICD-10-CM
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
## 2020-08-17 DIAGNOSIS — Z86.718: ICD-10-CM

## 2020-08-17 DIAGNOSIS — Z89.422: ICD-10-CM

## 2020-08-17 DIAGNOSIS — E11.621: ICD-10-CM

## 2020-08-17 DIAGNOSIS — E44.0: ICD-10-CM

## 2020-08-17 DIAGNOSIS — Z86.711: ICD-10-CM

## 2020-08-17 DIAGNOSIS — E11.69: ICD-10-CM

## 2020-08-17 DIAGNOSIS — Z79.84: ICD-10-CM

## 2020-08-17 DIAGNOSIS — M86.372: ICD-10-CM

## 2020-08-21 ENCOUNTER — HOSPITAL ENCOUNTER (OUTPATIENT)
Dept: HOSPITAL 35 - MRI | Age: 50
End: 2020-08-21
Attending: PREVENTIVE MEDICINE
Payer: COMMERCIAL

## 2020-08-21 DIAGNOSIS — M86.8X7: ICD-10-CM

## 2020-08-21 DIAGNOSIS — R60.0: ICD-10-CM

## 2020-08-21 DIAGNOSIS — T81.30XA: ICD-10-CM

## 2020-08-21 DIAGNOSIS — L97.512: ICD-10-CM

## 2020-08-21 DIAGNOSIS — E11.621: Primary | ICD-10-CM

## 2020-08-21 DIAGNOSIS — T81.89XA: ICD-10-CM

## 2020-08-21 DIAGNOSIS — M62.89: ICD-10-CM

## 2020-08-25 ENCOUNTER — HOSPITAL ENCOUNTER (OUTPATIENT)
Dept: HOSPITAL 35 - HYPER | Age: 50
End: 2020-08-25
Attending: PREVENTIVE MEDICINE
Payer: COMMERCIAL

## 2020-08-25 DIAGNOSIS — L97.512: ICD-10-CM

## 2020-08-25 DIAGNOSIS — E11.69: ICD-10-CM

## 2020-08-25 DIAGNOSIS — L84: ICD-10-CM

## 2020-08-25 DIAGNOSIS — E11.621: ICD-10-CM

## 2020-08-25 DIAGNOSIS — E11.40: ICD-10-CM

## 2020-08-25 DIAGNOSIS — E44.0: ICD-10-CM

## 2020-08-25 DIAGNOSIS — Z86.14: ICD-10-CM

## 2020-08-25 DIAGNOSIS — Z79.84: ICD-10-CM

## 2020-08-25 DIAGNOSIS — R60.0: ICD-10-CM

## 2020-08-25 DIAGNOSIS — Z89.422: ICD-10-CM

## 2020-08-25 DIAGNOSIS — M86.372: ICD-10-CM

## 2020-08-25 DIAGNOSIS — Z86.718: ICD-10-CM
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
## 2020-08-25 DIAGNOSIS — T81.89XD: Primary | ICD-10-CM

## 2020-08-25 DIAGNOSIS — Z86.711: ICD-10-CM

## 2020-08-25 DIAGNOSIS — Z79.82: ICD-10-CM

## 2020-08-27 ENCOUNTER — HOSPITAL ENCOUNTER (INPATIENT)
Dept: HOSPITAL 35 - ER | Age: 50
LOS: 5 days | Discharge: HOME HEALTH | DRG: 853 | End: 2020-09-01
Attending: HOSPITALIST | Admitting: HOSPITALIST
Payer: COMMERCIAL

## 2020-08-27 VITALS — DIASTOLIC BLOOD PRESSURE: 93 MMHG | SYSTOLIC BLOOD PRESSURE: 120 MMHG

## 2020-08-27 VITALS — DIASTOLIC BLOOD PRESSURE: 67 MMHG | SYSTOLIC BLOOD PRESSURE: 108 MMHG

## 2020-08-27 VITALS — BODY MASS INDEX: 42.66 KG/M2 | HEIGHT: 72.01 IN | WEIGHT: 315 LBS

## 2020-08-27 DIAGNOSIS — G92: ICD-10-CM

## 2020-08-27 DIAGNOSIS — I96: ICD-10-CM

## 2020-08-27 DIAGNOSIS — E11.621: ICD-10-CM

## 2020-08-27 DIAGNOSIS — Z79.4: ICD-10-CM

## 2020-08-27 DIAGNOSIS — A41.9: Primary | ICD-10-CM

## 2020-08-27 DIAGNOSIS — Z20.828: ICD-10-CM

## 2020-08-27 DIAGNOSIS — E11.65: ICD-10-CM

## 2020-08-27 DIAGNOSIS — Z79.899: ICD-10-CM

## 2020-08-27 DIAGNOSIS — E78.5: ICD-10-CM

## 2020-08-27 DIAGNOSIS — I11.0: ICD-10-CM

## 2020-08-27 DIAGNOSIS — M86.8X7: ICD-10-CM

## 2020-08-27 DIAGNOSIS — Z86.718: ICD-10-CM

## 2020-08-27 DIAGNOSIS — N17.0: ICD-10-CM

## 2020-08-27 DIAGNOSIS — I50.32: ICD-10-CM

## 2020-08-27 DIAGNOSIS — Z79.84: ICD-10-CM

## 2020-08-27 DIAGNOSIS — G89.4: ICD-10-CM

## 2020-08-27 DIAGNOSIS — E11.69: ICD-10-CM

## 2020-08-27 DIAGNOSIS — E44.0: ICD-10-CM

## 2020-08-27 DIAGNOSIS — Z86.711: ICD-10-CM

## 2020-08-27 DIAGNOSIS — E87.5: ICD-10-CM

## 2020-08-27 DIAGNOSIS — E87.1: ICD-10-CM

## 2020-08-27 DIAGNOSIS — L03.031: ICD-10-CM

## 2020-08-27 DIAGNOSIS — Z79.01: ICD-10-CM

## 2020-08-27 DIAGNOSIS — E11.52: ICD-10-CM

## 2020-08-27 LAB
ALBUMIN SERPL-MCNC: 2.9 G/DL (ref 3.4–5)
ALT SERPL-CCNC: 14 U/L (ref 30–65)
ANION GAP SERPL CALC-SCNC: 10 MMOL/L (ref 7–16)
AST SERPL-CCNC: 19 U/L (ref 15–37)
BASOPHILS NFR BLD AUTO: 0.7 % (ref 0–2)
BILIRUB DIRECT SERPL-MCNC: 0.1 MG/DL
BILIRUB SERPL-MCNC: 0.4 MG/DL (ref 0.2–1)
BUN SERPL-MCNC: 35 MG/DL (ref 7–18)
CALCIUM SERPL-MCNC: 8.9 MG/DL (ref 8.5–10.1)
CHLORIDE SERPL-SCNC: 98 MMOL/L (ref 98–107)
CO2 SERPL-SCNC: 22 MMOL/L (ref 21–32)
CREAT SERPL-MCNC: 2.9 MG/DL (ref 0.7–1.3)
EOSINOPHIL NFR BLD: 0.9 % (ref 0–3)
ERYTHROCYTE [DISTWIDTH] IN BLOOD BY AUTOMATED COUNT: 14.8 % (ref 10.5–14.5)
GLUCOSE SERPL-MCNC: 110 MG/DL (ref 74–106)
GRANULOCYTES NFR BLD MANUAL: 80.9 % (ref 36–66)
HCT VFR BLD CALC: 32.1 % (ref 42–52)
HGB BLD-MCNC: 10.5 GM/DL (ref 14–18)
LIPASE: 94 U/L (ref 73–393)
LYMPHOCYTES NFR BLD AUTO: 11.1 % (ref 24–44)
MCH RBC QN AUTO: 26.7 PG (ref 26–34)
MCHC RBC AUTO-ENTMCNC: 32.6 G/DL (ref 28–37)
MCV RBC: 82 FL (ref 80–100)
MONOCYTES NFR BLD: 6.4 % (ref 1–8)
NEUTROPHILS # BLD: 14.2 THOU/UL (ref 1.4–8.2)
PLATELET # BLD: 385 THOU/UL (ref 150–400)
POTASSIUM SERPL-SCNC: 5.6 MMOL/L (ref 3.5–5.1)
PROT SERPL-MCNC: 8.7 G/DL (ref 6.4–8.2)
RBC # BLD AUTO: 3.92 MIL/UL (ref 4.5–6)
SODIUM SERPL-SCNC: 130 MMOL/L (ref 136–145)
WBC # BLD AUTO: 21.1 THOU/UL (ref 4–11)

## 2020-08-27 PROCEDURE — 50951 ENDOSCOPY OF URETER: CPT

## 2020-08-27 PROCEDURE — 50101: CPT

## 2020-08-27 PROCEDURE — 62900: CPT

## 2020-08-27 PROCEDURE — 70005: CPT

## 2020-08-27 PROCEDURE — 62110: CPT

## 2020-08-27 PROCEDURE — 50386 REMOVE STENT VIA TRANSURETH: CPT

## 2020-08-27 PROCEDURE — 10081 I&D PILONIDAL CYST COMP: CPT

## 2020-08-27 PROCEDURE — 0JBQ0ZZ EXCISION OF RIGHT FOOT SUBCUTANEOUS TISSUE AND FASCIA, OPEN APPROACH: ICD-10-PCS | Performed by: PODIATRIST

## 2020-08-27 PROCEDURE — 10879: CPT

## 2020-08-27 PROCEDURE — 10194: CPT

## 2020-08-27 PROCEDURE — 57091: CPT

## 2020-08-28 VITALS — SYSTOLIC BLOOD PRESSURE: 102 MMHG | DIASTOLIC BLOOD PRESSURE: 57 MMHG

## 2020-08-28 VITALS — SYSTOLIC BLOOD PRESSURE: 95 MMHG | DIASTOLIC BLOOD PRESSURE: 54 MMHG

## 2020-08-28 VITALS — DIASTOLIC BLOOD PRESSURE: 56 MMHG | SYSTOLIC BLOOD PRESSURE: 102 MMHG

## 2020-08-28 VITALS — DIASTOLIC BLOOD PRESSURE: 56 MMHG | SYSTOLIC BLOOD PRESSURE: 125 MMHG

## 2020-08-28 VITALS — SYSTOLIC BLOOD PRESSURE: 113 MMHG | DIASTOLIC BLOOD PRESSURE: 87 MMHG

## 2020-08-28 VITALS — DIASTOLIC BLOOD PRESSURE: 67 MMHG | SYSTOLIC BLOOD PRESSURE: 108 MMHG

## 2020-08-28 VITALS — DIASTOLIC BLOOD PRESSURE: 62 MMHG | SYSTOLIC BLOOD PRESSURE: 102 MMHG

## 2020-08-28 LAB
ANION GAP SERPL CALC-SCNC: 9 MMOL/L (ref 7–16)
BUN SERPL-MCNC: 38 MG/DL (ref 7–18)
CALCIUM SERPL-MCNC: 8.6 MG/DL (ref 8.5–10.1)
CHLORIDE SERPL-SCNC: 101 MMOL/L (ref 98–107)
CO2 SERPL-SCNC: 23 MMOL/L (ref 21–32)
CREAT SERPL-MCNC: 3 MG/DL (ref 0.7–1.3)
ERYTHROCYTE [DISTWIDTH] IN BLOOD BY AUTOMATED COUNT: 14.6 % (ref 10.5–14.5)
GLUCOSE SERPL-MCNC: 91 MG/DL (ref 74–106)
HCT VFR BLD CALC: 29.3 % (ref 42–52)
HGB BLD-MCNC: 9.7 GM/DL (ref 14–18)
MCH RBC QN AUTO: 27.1 PG (ref 26–34)
MCHC RBC AUTO-ENTMCNC: 33 G/DL (ref 28–37)
MCV RBC: 82.3 FL (ref 80–100)
PLATELET # BLD: 319 THOU/UL (ref 150–400)
POTASSIUM SERPL-SCNC: 5.9 MMOL/L (ref 3.5–5.1)
RBC # BLD AUTO: 3.56 MIL/UL (ref 4.5–6)
SODIUM SERPL-SCNC: 133 MMOL/L (ref 136–145)
WBC # BLD AUTO: 8.7 THOU/UL (ref 4–11)

## 2020-08-28 NOTE — NUR
ASSUMED PATIENT CARE AT 0700. A/O X4. COVID PCR NEGATIVE. DR ROBBINS NOTIFIED
SENT MRI RESULT 8/21/20 TO DR ROBBINS. HE WANT A STAT MRI BEFORE TOMORROW
SURGEY. CALLED [facility] FOR STAT MRI. VSS. AFEBRILE. WILL KEEP MONIYOR.

## 2020-08-28 NOTE — NUR
Assess due to RD consult received.  Pt admit with osteomyelitis, toe wound.
Has been here in past.  Hx DM, CHF, PVD.  Confused upon admit, possible toxic
encephalopathy.  Usually eats very well.  BMI 50.5, extreme class III obesity.
Wts usual 340-350's, this admit 360 vs 372 lb ?.  A1C is pending.  NPO for
possible surgical intervention.  Limited info in chart since pt newly
admitted.  Will place order for José bid once diet advanced, and currently
presents low nutrition risk.

## 2020-08-28 NOTE — EKG
[facility]
[address]
[place]   [address]                     ELECTROCARDIOGRAM REPORT      
_______________________________________________________________________________
 
Name:       JO ANN ESPOSITO                Room #:         353-P       ADM IN  
M.R.#:      [id number]                       Account #:      [id number]  
Admission:  20    Attend Phys:    Vaughn Botello MD    
Discharge:              Date of Birth:  [birth year]  
                                                          Report #: [id number]
                                                                    [id number]
_______________________________________________________________________________
THIS REPORT FOR:  
 
cc:  Mariano Vega James A. DO Lundgren, Craig H. MD [facility]                                        ~
THIS REPORT FOR:   //name//                          
 
                         [facility] ED
                                       
Test Date:    2020               Test Time:    19:54:46
Pat Name:     JO ANN ESPOSITO           Department:   
Patient ID:   [id number]            Room:         353 P
Gender:       M                        Technician:   STORMY
:          1970               Requested By: Vaughn Botello
Order Number: [id number] MD:   Quoc Morelos
                                 Measurements
Intervals                              Axis          
Rate:         108                      P:            68
DE:           196                      QRS:          23
QRSD:         103                      T:            33
QT:           323                                    
QTc:          433                                    
                           Interpretive Statements
Sinus tachycardia
Baseline wander 
Compared to ECG 03/10/2020 16:57:50
ST and T wave abnormalities less pronounced
Electronically Signed On 2020 16:45:22 CDT by Quoc Morelos
[email or web address]
 
 
 
 
 
 
 
 
 
 
 
 
 
 
 
  <ELECTRONICALLY SIGNED>
   By: Quoc Morelos MD, [facility]   
  20     1645
D: 20                           _____________________________________
T: 20                           Quoc Morelos MD, [facility]     /EPI

## 2020-08-28 NOTE — NUR
PT ARRIVED TO UNIT APPROX 0030, ADMISSION AND ASSESSMENT COMPLETED. PT A&Ox4
BUT SLIGHTLY FORGETFUL/HAVING WANDERING THOUGHTS. WOUND PHOTO TAKEN OF
NECROTIC RIGHT GREAT TOE. IVF AND ABX STARTED. HAVE GIVEN IV AND PO PAIN MEDS
OVERNIGHT, OTHERWISE NPO IN CASE OF SURGERY TODAY. NO OTHER CONCERNS, WILL
CONTINUE TO MONITOR.

## 2020-08-29 VITALS — SYSTOLIC BLOOD PRESSURE: 107 MMHG | DIASTOLIC BLOOD PRESSURE: 73 MMHG

## 2020-08-29 VITALS — SYSTOLIC BLOOD PRESSURE: 111 MMHG | DIASTOLIC BLOOD PRESSURE: 72 MMHG

## 2020-08-29 VITALS — SYSTOLIC BLOOD PRESSURE: 112 MMHG | DIASTOLIC BLOOD PRESSURE: 78 MMHG

## 2020-08-29 VITALS — SYSTOLIC BLOOD PRESSURE: 150 MMHG | DIASTOLIC BLOOD PRESSURE: 77 MMHG

## 2020-08-29 VITALS — SYSTOLIC BLOOD PRESSURE: 126 MMHG | DIASTOLIC BLOOD PRESSURE: 67 MMHG

## 2020-08-29 LAB
ANION GAP SERPL CALC-SCNC: 12 MMOL/L (ref 7–16)
BUN SERPL-MCNC: 37 MG/DL (ref 7–18)
CALCIUM SERPL-MCNC: 8.7 MG/DL (ref 8.5–10.1)
CHLORIDE SERPL-SCNC: 104 MMOL/L (ref 98–107)
CO2 SERPL-SCNC: 20 MMOL/L (ref 21–32)
CREAT SERPL-MCNC: 2.6 MG/DL (ref 0.7–1.3)
ERYTHROCYTE [DISTWIDTH] IN BLOOD BY AUTOMATED COUNT: 14.8 % (ref 10.5–14.5)
EST. AVERAGE GLUCOSE BLD GHB EST-MCNC: 171 MG/DL
GLUCOSE SERPL-MCNC: 71 MG/DL (ref 74–106)
GLYCOHEMOGLOBIN (HGB A1C): 7.6 % (ref 4.8–5.6)
HCT VFR BLD CALC: 29.4 % (ref 42–52)
HGB BLD-MCNC: 9.3 GM/DL (ref 14–18)
MCH RBC QN AUTO: 26.7 PG (ref 26–34)
MCHC RBC AUTO-ENTMCNC: 31.7 G/DL (ref 28–37)
MCV RBC: 84.3 FL (ref 80–100)
PLATELET # BLD: 309 THOU/UL (ref 150–400)
POTASSIUM SERPL-SCNC: 4.8 MMOL/L (ref 3.5–5.1)
RBC # BLD AUTO: 3.49 MIL/UL (ref 4.5–6)
SODIUM SERPL-SCNC: 136 MMOL/L (ref 136–145)
WBC # BLD AUTO: 8.6 THOU/UL (ref 4–11)

## 2020-08-29 NOTE — NUR
Pt transfereed from [facility] via w/c.Pt is a/ox4.Vss.has low grade fever.Denies
chills.N/v.On RA w/o resp distress.NSR on monitor.Transfer to bed with standby
assist.Right foot with dressing cdi.C/o pain of 9/10 to right foot.Pain meds
given as per orders with partial relief.Antibiotic infusing as per
orders.Oriented to rm and unit activities.Pt denies further concerns.NPO after
midnoc for possible right foot amputation this am.Will cont to monitor per
poc.

## 2020-08-29 NOTE — NUR
ASSUMED CARE PT SHIFT CHANGE. ASSESSMENTS AS CHARTED.MEDS GIVEN PER MAR. PT
ALERT AND ORIENTED. VSS. BLOOD SUGAR 68 THIS AM. TREATED WITH IV DEXTROSE,
BLOOD SUGAR STABLE. C/O PAIN RIGHT GREAT TOE, AMPUTATED THIS SHIFT. PT
STABLE. RIGHT FOOT WRAPPED IN ORIGINAL SURGICAL DRESSING. PT DID NOT VOID
BEFORE OR AFTER PROCEDURE. BLADDER SCANNED TO FIND >1350ML URINE. PHYSICIAN
NOTIFIED. ORDERS RECEIVED FOR CARDOSO, PT NOW DRAINING URINE EFFECTIVELY WITH
CARDOSO. PT UP SBA TOLERATING WELL. FAMILY FRIEND UPDATED ON POC. PT CURRENTLY
RESTING IN BED DENIES NEEDS. CONTINUING TO MONITOR. WILL PASS ON REPORT TO DANIELLE LAROSE.

## 2020-08-30 VITALS — DIASTOLIC BLOOD PRESSURE: 76 MMHG | SYSTOLIC BLOOD PRESSURE: 115 MMHG

## 2020-08-30 VITALS — SYSTOLIC BLOOD PRESSURE: 112 MMHG | DIASTOLIC BLOOD PRESSURE: 71 MMHG

## 2020-08-30 VITALS — SYSTOLIC BLOOD PRESSURE: 133 MMHG | DIASTOLIC BLOOD PRESSURE: 75 MMHG

## 2020-08-30 VITALS — SYSTOLIC BLOOD PRESSURE: 124 MMHG | DIASTOLIC BLOOD PRESSURE: 75 MMHG

## 2020-08-30 VITALS — DIASTOLIC BLOOD PRESSURE: 63 MMHG | SYSTOLIC BLOOD PRESSURE: 113 MMHG

## 2020-08-30 LAB
ANION GAP SERPL CALC-SCNC: 10 MMOL/L (ref 7–16)
BUN SERPL-MCNC: 24 MG/DL (ref 7–18)
CALCIUM SERPL-MCNC: 8.5 MG/DL (ref 8.5–10.1)
CHLORIDE SERPL-SCNC: 106 MMOL/L (ref 98–107)
CO2 SERPL-SCNC: 23 MMOL/L (ref 21–32)
CREAT SERPL-MCNC: 1.5 MG/DL (ref 0.7–1.3)
ERYTHROCYTE [DISTWIDTH] IN BLOOD BY AUTOMATED COUNT: 14.6 % (ref 10.5–14.5)
GLUCOSE SERPL-MCNC: 80 MG/DL (ref 74–106)
HCT VFR BLD CALC: 30.3 % (ref 42–52)
HGB BLD-MCNC: 9.7 GM/DL (ref 14–18)
MCH RBC QN AUTO: 26.8 PG (ref 26–34)
MCHC RBC AUTO-ENTMCNC: 32.1 G/DL (ref 28–37)
MCV RBC: 83.3 FL (ref 80–100)
PLATELET # BLD: 349 THOU/UL (ref 150–400)
POTASSIUM SERPL-SCNC: 4.9 MMOL/L (ref 3.5–5.1)
RBC # BLD AUTO: 3.64 MIL/UL (ref 4.5–6)
SODIUM SERPL-SCNC: 139 MMOL/L (ref 136–145)
WBC # BLD AUTO: 6.2 THOU/UL (ref 4–11)

## 2020-08-30 NOTE — NUR
ASSUMED CARE OF PT AT SHIFT CHANGE. ASSESSMENTS AS CHARTED. MEDS GIVEN PER
MAR. PT A&OX4, PAIN D/T R GREAT TOE AMPUTATION TREATED WITH IV AND PO MEDS
WITH PARTIAL RELIEF. SURGICAL DRESSING INTACT AND NOT REMOVED, WILL WAIT UNTIL
SURGEON ROUNDS. ABX AND IVF CONTINUE TO INFUSE. CARDOSO IN PLACE D/T RETENTION.
WILL CONTINUE TO MONITOR AND REPORT TO NOC NURSE.

## 2020-08-30 NOTE — NUR
ASSUME CARE 1900. PT/VITALS STABLE. INTERMITTENT RIGHT FOOT PAIN. TOLERATES
ACTIVITY WELL. ASSESSMENT AS CHARTED. PROGRESSING WELL WITH POC. PLAN IS
INFECTION PREVENTION WITH ABX/MONITOR KIDNEY FUNCTION. WILL CONTINUE TO
MONITOR AND FOLLOW WIHT POC

## 2020-08-31 VITALS — DIASTOLIC BLOOD PRESSURE: 61 MMHG | SYSTOLIC BLOOD PRESSURE: 133 MMHG

## 2020-08-31 VITALS — DIASTOLIC BLOOD PRESSURE: 77 MMHG | SYSTOLIC BLOOD PRESSURE: 147 MMHG

## 2020-08-31 VITALS — SYSTOLIC BLOOD PRESSURE: 130 MMHG | DIASTOLIC BLOOD PRESSURE: 91 MMHG

## 2020-08-31 VITALS — SYSTOLIC BLOOD PRESSURE: 146 MMHG | DIASTOLIC BLOOD PRESSURE: 81 MMHG

## 2020-08-31 LAB
ANION GAP SERPL CALC-SCNC: 8 MMOL/L (ref 7–16)
BUN SERPL-MCNC: 17 MG/DL (ref 7–18)
CALCIUM SERPL-MCNC: 8.9 MG/DL (ref 8.5–10.1)
CHLORIDE SERPL-SCNC: 108 MMOL/L (ref 98–107)
CO2 SERPL-SCNC: 25 MMOL/L (ref 21–32)
CREAT SERPL-MCNC: 1.4 MG/DL (ref 0.7–1.3)
ERYTHROCYTE [DISTWIDTH] IN BLOOD BY AUTOMATED COUNT: 14.7 % (ref 10.5–14.5)
GLUCOSE SERPL-MCNC: 84 MG/DL (ref 74–106)
HCT VFR BLD CALC: 30.6 % (ref 42–52)
HGB BLD-MCNC: 9.8 GM/DL (ref 14–18)
MCH RBC QN AUTO: 26.9 PG (ref 26–34)
MCHC RBC AUTO-ENTMCNC: 31.9 G/DL (ref 28–37)
MCV RBC: 84.2 FL (ref 80–100)
PLATELET # BLD: 356 THOU/UL (ref 150–400)
POTASSIUM SERPL-SCNC: 5.3 MMOL/L (ref 3.5–5.1)
RBC # BLD AUTO: 3.63 MIL/UL (ref 4.5–6)
SODIUM SERPL-SCNC: 141 MMOL/L (ref 136–145)
WBC # BLD AUTO: 5.2 THOU/UL (ref 4–11)

## 2020-08-31 NOTE — NUR
PT UP TO CHAIR, ABLE TO MOVE HIMSELF FROM BED TO CHAIR, RECLINED, FEET UP,
ORTHO SHOW IN WINDOW LEDGE, PT AWARE OF PENDING DC MOST LIKELY TOMORROW, FEELS
GOOD ABOUT HH COMING FOR WOUND CARE.  FOOT EXAMINED AND REDRESSED BY SURGERY
THIS AM.

## 2020-08-31 NOTE — NUR
met with patient who admits with gangrne right toe. Patient rec amputation of
toe. patient resides in independent ranch home. He has a walker if needed. He
has hx of IV antibiotics and HH care. [facility] for infusion and
[facility] for [facility] care. Rec order from DR Chavez for infusion at home.
PICC line to be placed. Patient aware and agreeable. Sent referral packet to
[facility] and [facility] to alert of dc home with home infusion. PCP Dr Mejia.

## 2020-09-01 VITALS — SYSTOLIC BLOOD PRESSURE: 160 MMHG | DIASTOLIC BLOOD PRESSURE: 90 MMHG

## 2020-09-01 VITALS — SYSTOLIC BLOOD PRESSURE: 145 MMHG | DIASTOLIC BLOOD PRESSURE: 86 MMHG

## 2020-09-01 LAB
ANION GAP SERPL CALC-SCNC: 10 MMOL/L (ref 7–16)
BUN SERPL-MCNC: 16 MG/DL (ref 7–18)
CALCIUM SERPL-MCNC: 8.9 MG/DL (ref 8.5–10.1)
CHLORIDE SERPL-SCNC: 107 MMOL/L (ref 98–107)
CO2 SERPL-SCNC: 19 MMOL/L (ref 21–32)
CREAT SERPL-MCNC: 1 MG/DL (ref 0.7–1.3)
GLUCOSE SERPL-MCNC: 79 MG/DL (ref 74–106)
POTASSIUM SERPL-SCNC: 4.8 MMOL/L (ref 3.5–5.1)
SODIUM SERPL-SCNC: 136 MMOL/L (ref 136–145)

## 2020-09-01 PROCEDURE — B548ZZA ULTRASONOGRAPHY OF SUPERIOR VENA CAVA, GUIDANCE: ICD-10-PCS | Performed by: HOSPITALIST

## 2020-09-01 PROCEDURE — 02HV33Z INSERTION OF INFUSION DEVICE INTO SUPERIOR VENA CAVA, PERCUTANEOUS APPROACH: ICD-10-PCS | Performed by: HOSPITALIST

## 2020-09-01 NOTE — H
[facility]
[address]
[place]   [address]                     HISTORY AND PHYSICAL          
_______________________________________________________________________________
 
Name:       JO ANN ESPOSITO                Room #:         206-P       [facility] [place]  
M.R.#:      [id number]                       Account #:      [id number]  
Admission:  08/27/20    Attend Phys:    Rey Juarez MD     
Discharge:  09/01/20    Date of Birth:  [birth year]  
                                                          Report #: [id number]
                                                                    [id number]   
_______________________________________________________________________________
THIS REPORT FOR:  
 
cc:  Mariano Vega,Erik Chris DPM                                         ~
CC: Vaughn Vega
 
DATE OF SERVICE:  08/28/2020
 
 
INTRODUCTION:  This is a 50-year-old white male who has been admitted to [facility] for a right diabetic foot ulcer with osteomyelitis.
 
The patient was actually reporting having some loss of memory and extreme
confusion is another reason why he came in the ER and also was febrile.
 
The patient apparently has wound for quite some time for at least a month.  He
had been treated by Dr. Liu where actually he contacted me previously and
discussed possibility of a right hallux amputation.
 
PAST MEDICAL HISTORY:  Noted for hypertension, diabetes, hyperlipidemia and
peripheral neuropathy.
 
MEDICATIONS:  In the chart.  Cymbalta, Trulicity, Jardiance, Cardizem,
Gabapentin, lisinopril 20 mg, Trokendi XR, ____, metformin and methadone for
back pain as he has had significant chronic back pain for a while.
 
ALLERGIES:  No known drug allergies.
 
SOCIAL HISTORY:  No history of recent tobacco use.  No history of alcohol use. 
No use of recreational drugs.
 
PAST SURGICAL HISTORY:  Previous surgery in his foot.
 
FAMILY HISTORY:  Noncontributory.
 
REVIEW OF SYSTEMS:  The patient is very anxious about this procedure and about
his foot.  He is worried about losing more foot than they will allow him not be
able to ambulate as much.
 
PHYSICAL EXAMINATION:
GENERAL:  Well-developed, well-nourished white male, morbidly obese.
HEENT:  PERRLA.
NECK:  Supple.  No adenopathy.
HEART:  Regular rhythm and rate.
 
 
 
[facility]
[address]
[place]   [address]                     HISTORY AND PHYSICAL          
_______________________________________________________________________________
 
Name:       JO ANN ESPOSITO                Room #:         206-[facility] [place]  
M.R.#:      [id number]                       Account #:      [id number]  
Admission:  08/27/20    Attend Phys:    Rey Juarez MD     
Discharge:  09/01/20    Date of Birth:  [birth year]  
                                                          Report #: [id number]
                                                                    [id number]   
_______________________________________________________________________________
LUNGS:  No respiratory distress.
EXTREMITIES:  Lower extremity shows he has a wound in the right foot that covers
the entire plantar aspect of the foot.  It is soft and there is some slough
present and odor present and appears to be tracking down to the first MPJ joint.
 
DIAGNOSTIC IMAGING:  X-rays reveal involvement of the proximal and distal
phalanx.  MRI reveals osteo of the proximal and distal phalanx and possibly the
first metatarsal head that was done 8 days ago.  Arterial Doppler exam is being
done, not finished yet.  The patient's WBC is 21.0.  Glucose is within normal
limits, maybe slightly high.
 
ASSESSMENT:  Diabetic foot ulcer with osteomyelitis, possibly into the first
metatarsal head.
 
PLAN:  To get an MRI for further evaluation.  We will try to get them before the
surgery.  He was scheduled for 08/28/2020 at 11:00, but he had to get a COVID
test so that was now pushed back to Saturday at 10:00.  The patient and I
discussed the surgery in detail.  He understands that possible complications
such as chronic wound with delayed healing and possible loss of foot or more
proximal aspect of the medial column.
 
We may stage the procedure, so we can save as much of the medial column as
possible.  The patient understands that as well.  I talked to Dr. Liu about
the case were in agreement on how to proceed.
 
 
 
 
 
 
 
 
 
 
 
 
 
 
 
 
 
 
 
 
  <ELECTRONICALLY SIGNED>
   By: Erik Moreno DPM         
  09/01/20     1758
D: 08/29/20 1001                           _____________________________________
T: 08/29/20 1101                           Erik Moreno DPM           /nt

## 2020-09-01 NOTE — NUR
Picc line placed rec discharge orders for home infusion ceftriaxone once a
day. Rec dose at 9 am today. Faxed orders to [facility] and [facility]. Rec
confirmation for  start of care in am. medication to be delivered this evening
to patients home. Patient pulled PICC out. Reinserted and imaging completed.
Faxed updates to [facility] and [facility]. Patient dc home

## 2020-09-01 NOTE — NUR
VAT CONSULTED FOR A PICC FOR HOME ABX, A 4FRSLPICC PLACED IN [facility]. CXR
REVEALED THE TIP AT THE [facility].  LINE RELEASED FOR USE

## 2020-09-01 NOTE — NUR
VAT CONSULTED FOR A NEW PICC AS PT PULLED OUT PICC PLACED TODAY.  WAS ABLE TO
OTW EXCHANGE AND PLACE A NEW ONE.  TIP AT THE [facility] PER CXR AND PT ABLE TO DC
HOME WITH LINE

## 2020-09-01 NOTE — O
[facility]
[address]
[place]   [address]                     OPERATIVE REPORT              
_______________________________________________________________________________
 
Name:       JO ANN ESPOSITO                Room #:         206-P       [facility] [place]  
M.R.#:      [id number]                       Account #:      [id number]  
Admission:  08/27/20    Attend Phys:    Rey Juarez MD     
Discharge:  09/01/20    Date of Birth:  [birth year]  
                                                          Report #: [id number]
                                                                    [id number]   
_______________________________________________________________________________
THIS REPORT FOR:  
 
cc:  Mariano Vega,Erik Chris DPM                                         ~
CC: Vaughn Vega
 
DATE OF SERVICE:  08/29/2020
 
 
PREOPERATIVE DIAGNOSES:  Right diabetic foot ulcer with osteomyelitis, hallux.
 
POSTOPERATIVE DIAGNOSES:  Right diabetic foot ulcer with osteomyelitis, hallux.
 
PROCEDURE:  Right hallux amputation.
 
ANESTHESIA:  General with a local block consisting of 7 mL of 0.5% Marcaine.
 
TOURNIQUET:  None.
 
DESCRIPTION OF PROCEDURE:  The patient was transported to the operating room and
placed on the operating table in supine position.  General anesthesia was
administered.  Right lower extremity was prepped and draped in the usual sterile
manner.
 
Attention was directed to the right foot where a significant infection was noted
with the entire plantar aspect of the hallux involved with necrotic tissue and
soft tissue and a significant odor.
 
Of note, I was able to review the MRI from 8 days ago and second one done to see
if there is any further involvement along the first metatarsal.
 
I was able to discuss with the radiologist, Dr. Rashid, and we decided that the
first metatarsal showed no signs of infection, in fact the soft tissue
surrounding the first metatarsal looked very healthy as well, so there was a
little chance of this being infected.
 
____ also with the findings intraoperatively.  There was good healthy tissue
surrounding the first metatarsal and there was adequate tissue to do a closure
around the first metatarsal head.
 
I did two elliptical incisions around this hallux and disarticulated and took
deep tissue for aerobic, anaerobic, fungal of the hallux.
 
Then, I cleaned out the tissue nicely around the area.  There was purulence
 
 
 
[facility]
[address]
[place]   [address]                     OPERATIVE REPORT              
_______________________________________________________________________________
 
Name:       VAIBHAVJO ANN                Room #:         206-P       [facility] [place]  
M.R.#:      [id number]                       Account #:      [id number]  
Admission:  08/27/20    Attend Phys:    Rey Juarez MD     
Discharge:  09/01/20    Date of Birth:  [birth year]  
                                                          Report #: [id number]
                                                                    [id number]   
_______________________________________________________________________________
noted in the first MPJ joint, which was somewhat concerning and also tracked
somewhat plantar of the first metatarsal head, but after cleaning, it looked
very good and healthy.
 
In the end, it was questionable whether this was healed with distal right hallux
amputation, but it looked good enough to give it a chance.
 
So, I was able to close it with 2-0 nylon using simple suture technique.
 
This was lavaged thoroughly before it was closed and the tissue bleeding was
controlled with the Bovie.
 
It was dressed with fluffs, Kerlix and outer Ace bandage.
 
The patient tolerated the procedure well and left the operating room in stable
condition with neurovascular status and vital status intact.  Plan is to watch
for the next 2-4 days.  If there are any signs of dehiscence of the incision
line, then I will remove the sutures and go back and debride further and
possibly do a partial first metatarsal resection or full metatarsal resection
based on the findings in that second surgery.
 
So, possibly second surgery will be determined in next 2-4 days.
 
Dr. Liu and I both decided it was best to try to save the first metatarsal
given the patient's age and activity level.
 
____ more complications if we did a first metatarsal and the patient understands
and agrees with moving forward with this plan.
 
 
 
 
 
 
 
 
 
 
 
 
 
 
 
 
  <ELECTRONICALLY SIGNED>
   By: Erik Moreno DPM         
  09/01/20     1758
D: 08/29/20 1119                           _____________________________________
T: 08/29/20 1147                           Erik Moreno DPM           /nt

## 2020-09-01 NOTE — NUR
assumed pt care at the change of shift, pt is awake, alert and orientedx4, sr
on the mnonitor, assessment as charted, c/o pain of 8/10 on the right lower
extremity, medicated prn as per orders with partial relief, iv fluids
infusibng as ordered to the right forearm, sales in place, adequate urine
output, dressing to the right leg cdi, medications given as per mar, denies
chest pain or sob, no acute distress noted, will continue to monitor

## 2020-09-01 NOTE — HC
[facility]
[address]   [address]                     CONSULTATION                  
_______________________________________________________________________________
 
Name:       JO ANN ESPOSITO                Room #:         206-P       ADM IN  
M.R.#:      [id number]                       Account #:      [id number]  
Admission:  08/27/20    Attend Phys:    Rey Juarez MD     
Discharge:              Date of Birth:  [birth year]  
                                                          Report #: [id number]
                                                                    [id number]   
_______________________________________________________________________________
THIS REPORT FOR:  
 
cc:  Mariano Vega,Андрей Paredes MD                                          ~
CC: Vaughn Vega
 
DATE OF SERVICE:  08/28/2020
 
 
WOUND CARE CONSULTATION
 
PERSONAL PHYSICIAN:  Silvia.
 
CHIEF COMPLAINT:  Right toe wound with cellulitis.
 
HISTORY OF PRESENT ILLNESS:  This is a 50-year-old white male with a
longstanding history of only moderately controlled diabetes who I have been
following in the [facility] for several weeks for chronic ulceration on his
right great toe.  The patient was nearly healed at one point in time several
weeks ago and then due to job obligations, the patient went several weeks
without being seen, stated he was on his feet most of those days with heavy work
boots and had significant amount of sweating.  He noted that his toe was having
increased maceration and drainage as well, this past week started having an
increased odor.  I saw the patient 2 days ago in clinic.  At that point in time,
the patient was offered an admission because his MRI came back positive for
osteomyelitis as well as culture was positive for Proteus vulgaris.  The
patient, however, refused admission that day because of more obligations that he
had to perform, but said he would follow up with Dr. Chavez and Dr. Moreno as an
outpatient.  Both of these physicians were contacted and outpatient appointments
were made.  The patient did see Dr. Chavez yesterday.  At that time, Dr. Chavez
called me, said his foot was now cellulitic up to his ankle, which had not been
48 hours ago, and the patient was recommended hospitalization.  The patient then
stated he left Dr. Chavez's office, started driving around, became very confused
and his girlfriend actually talked to him and contacted EMS stating that he was
confused and brought to the Emergency Department here at [facility] where he was admitted for sepsis and cellulitis of the right great toe. 
The patient this morning states he feels much better.  Admits to the fact that
he was confused yesterday and is agreeable to undergoing the amputation with Dr. Moreno.  The patient has no other associated wounds at this time.
 
PAST MEDICAL HISTORY:  Longstanding history of diabetes type 2, hypertension,
hyperlipidemia, previous diabetic left great toe ulcer with underlying
osteomyelitis and subsequent amputation, history of diastolic heart failure,
previous DVT with bilateral pulmonary emboli and recurrent sepsis.  The patient
 
 
 
[facility]
[address]
[place]   [address]                     CONSULTATION                  
_______________________________________________________________________________
 
Name:       JO ANN ESPOSITO                Room #:         206-P       ADM IN  
M.R.#:      [id number]                       Account #:      [id number]  
Admission:  08/27/20    Attend Phys:    Rey Juarez MD     
Discharge:              Date of Birth:  [birth year]  
                                                          Report #: [id number]
                                                                    [id number]   
_______________________________________________________________________________
also has chronic pain syndrome, which is being treated by the pain clinic.
 
CURRENT MEDICATIONS:  Multiple.  I reviewed the patient's medication list.
 
DRUG ALLERGIES:  None.
 
SOCIAL HISTORY:  The patient does not smoke tobacco or drink alcohol.  Lives
independently.
 
FAMILY HISTORY:  Not pertinent to current medical condition.
 
REVIEW OF SYSTEMS:
CONSTITUTIONAL:  The patient had chills in the past 24 hours.  Denies chills in
the past 24 hours, but does state that he has a low-grade fever.
NEUROLOGIC:  The patient complains of generalized weakness, but no isolated
weakness in arms or legs.
EYES:  No complaints.
ENT:  No complaints.
CARDIAC:  The patient has chronic lower extremity edema.  No chest pain or
palpitation.
RESPIRATORY:  The patient denies shortness of breath, cough or wheezes.
GASTROINTESTINAL:  The patient denies nausea, vomiting or abdominal pain.
GENITOURINARY:  The patient denies urgency or frequency.
MUSCULOSKELETAL:  No complaints.
SKIN:  The patient has right great toe ulceration with subsequent cellulitis.
 
PHYSICAL EXAMINATION:
VITAL SIGNS:  Temperature 36.8, pulse 77, respirations 18, BP 95/54.
GENERAL:  This is a pleasant white male who is in mild distress secondary to
symptoms.
HEENT:  Normocephalic, atraumatic.  Mucous membranes are dry.  Pupils are round.
 Sclerae white.
NECK:  Supple, nontender.
LUNGS:  Clear.
HEART:  Regular.
ABDOMEN:  Obese, soft, nontender.
EXTREMITIES:  Evaluation of right lower extremity reveals a chronic ulcer on the
plantar aspect of the right foot, which is now necrotic foul smelling drainage
and exposed bone.  There is increased erythema, warmth and some tenderness
extending into the foot up to the ankle.  The patient has 1+ dorsalis pedis and
posterior tibial pulse.  No other associated ulcerations are noted.
NEUROLOGIC:  Cranial nerves 2-12 grossly intact.  Motor and sensory grossly
intact.
 
LABORATORY DATA:  White count 8.7, hemoglobin 9.7, BUN 38, creatinine 3.0,
albumin 2.9.  C-reactive protein was 239.5.
 
 
 
[address]   [address]                     CONSULTATION                  
_______________________________________________________________________________
 
Name:       JO ANN ESPOSITO                Room #:         206-P       ADM IN  
M.R.#:      [id number]                       Account #:      [id number]  
Admission:  08/27/20    Attend Phys:    Rey Juarez MD     
Discharge:              Date of Birth:  [birth year]  
                                                          Report #: [id number]
                                                                    [id number]   
_______________________________________________________________________________
 
IMPRESSION:
1.  Chronic ulceration, right great toe with underlying osteomyelitis, now with
right foot cellulitis and sepsis.
2.  Sepsis secondary to chronic ulceration.
3.  Diabetes mellitus type 2.
4.  Obesity.
5.  Protein-calorie malnutrition, moderate, albumin 2.9.
6.  Generalized debility.
 
PLAN:  Arterial Dopplers have been ordered to evaluate the patient's underlying
arterial status.  We will use Dakin's moist-to-dry dressings and the patient
still pending a surgical amputation.  Dr. Moreno spoke with me about taking the
patient to surgery hopefully today pending his COVID-19 testing.  IV antibiotics
have been started.  Dr. Chavez is following the patient as well.  We will maximize
the patient's oral protein supplementation for healing.  We will utilize
physical and occupational therapy post-knee amputation to assist in the
patient's strengthening.   We will continue all other current medications and
continue to follow the patient.
 
 
 
 
 
 
 
 
 
 
 
 
 
 
 
 
 
 
 
 
 
 
 
 
 
  <ELECTRONICALLY SIGNED>
   By: Андрей Liu MD          
  09/01/20     1337
D: 08/28/20 1313                           _____________________________________
T: 08/28/20 1843                           Андрей Liu MD            /nt

## 2020-09-01 NOTE — NUR
CAME INTO PT'S ROOM TO DISCONTNUE IV'S. PT STATED TO ME THAT HE PULLED THEM
OUT IN THE BATHROOM. PT THEN PULLED GOWN SLEEVE UP WHICH HAD DRESSING OF PICC
LINE LEFT UPPER ARM ATTACHED TO THE GOWN. THIS THEM PULLED OUT THE PISCC LINE
ABOUT HALF WAY. CONTACT VASCULAR TEAL WHO WILL COME TO ASSESS. INSTRUCT PT NOT
TO PULL ON LINES. WILL CONTINUE TO ASSESS.

## 2020-09-04 NOTE — PATH
[facility]
[address]
[place]   [address]                     PATHOLOGY RPT PROCEDURE       
_______________________________________________________________________________
 
Name:       JO ANN ESPOSITO                Room #:         206-P       DIS IN  
M.R.#:      [id number]     Account #:      [id number]  
Admission:  08/27/20    Date of Birth:  [birth year]  
Discharge:  09/01/20                                    Report #:    [id number]
                                                        Path Case #: [id number]
_______________________________________________________________________________
 
[facility] Accession Number: [id number]
.                                                                01
Material submitted:                                        .
hallux - RIGHT BIG TOE. Modifiers: right
.                                                                01
Clinical history:                                          .
GANGRENE RIGHT BIG TOE, SEPSIS, UNSPECIFIED ORGANISM TOXIC ENCEPHALOPATHY
.                                                                02
**********************************************************************
Diagnosis:
Right great toe, amputation:
- Gangrenous necrosis with associated ulcer.
- Acute osteomyelitis.
- See comment.
(MAP:[facility]; 09/03/2020)
[facility]  09/03/2020  1442 Local
**********************************************************************
.                                                                02
Comment:
Acute osteomyelitis is present in the proximal portion of bone.  Given
this amputation appears to be a disarticulation, the acute osteomyelitis
present in this bone is not felt to represent involvement of a resection
margin.  Clinical correlation is recommended.
(MAP:[facility]; 09/03/2020)
.                                                                02
Electronically signed:                                     .
Steve Norton MD, Pathologist
NPI- [id number]
.                                                                01
Gross description:                                         .
The specimen is received in formalin, labeled "Jo Ann Esposito, right big
toe".  Received is an amputated digit measuring 7.2 x 4.2 x 4.0 cm in
greatest dimensions.  The bone margin is smooth and concave in appearance,
consistent with disarticulation.  The bone and soft tissue margins are
inked black.  The nail is absent.  The epidermal surface displays a
predominantly light tan to gray-black appearance with necrosis identified
on the plantar aspect.  A full-thickness longitudinal cross-section is
submitted from proximal to distal aspects in cassettes A1 through A3,
following decalcification.
([facility]; 9/2/2020)
[facility]  09/02/2020  1034 Local
.                                                                02
Pathologist provided ICD-10:
I96, L97.519, M86.171
.                                                                02
CPT                                                        .
464667, 478108
 
 
[place] [address]                     PATHOLOGY RPT PROCEDURE       
_______________________________________________________________________________
 
Name:       JO ANN ESPOSITO                Room #:         206-P       DIS IN  
M.R.#:      [id number]     Account #:      [id number]  
Admission:  08/27/20    Date of Birth:  [birth year]  
Discharge:  09/01/20                                    Report #:    [id number]
                                                        Path Case #: [id number]
_______________________________________________________________________________
Specimen Comment: A courtesy copy of this report has been sent to [phone]
[phone]
Specimen Comment: [id number], [phone]
Specimen Comment: Report sent to ,DR EFRMIN / DR JACKSON
***Performed at:  01
   [facility], [place]  [id number]
   MD Gilberto Bowman MD Phone:  [phone]
***Performed at:  02
   [address]  [id number]
   MD Margie Hicks MD Phone:  [phone]

## 2020-09-08 ENCOUNTER — HOSPITAL ENCOUNTER (OUTPATIENT)
Dept: HOSPITAL 35 - HYPER | Age: 50
End: 2020-09-08
Attending: PREVENTIVE MEDICINE
Payer: COMMERCIAL

## 2020-09-08 DIAGNOSIS — E11.69: ICD-10-CM

## 2020-09-08 DIAGNOSIS — Z89.422: ICD-10-CM
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
## 2020-09-08 DIAGNOSIS — Z86.711: ICD-10-CM

## 2020-09-08 DIAGNOSIS — L84: ICD-10-CM

## 2020-09-08 DIAGNOSIS — Z86.718: ICD-10-CM

## 2020-09-08 DIAGNOSIS — T87.81: Primary | ICD-10-CM

## 2020-09-08 DIAGNOSIS — R60.0: ICD-10-CM

## 2020-09-08 DIAGNOSIS — M86.372: ICD-10-CM

## 2020-09-08 DIAGNOSIS — E11.40: ICD-10-CM

## 2020-09-08 DIAGNOSIS — E66.01: ICD-10-CM

## 2020-09-08 DIAGNOSIS — L97.514: ICD-10-CM

## 2020-09-08 DIAGNOSIS — E44.0: ICD-10-CM

## 2020-09-08 DIAGNOSIS — Z79.84: ICD-10-CM

## 2020-09-08 DIAGNOSIS — E11.621: ICD-10-CM

## 2020-09-09 ENCOUNTER — HOSPITAL ENCOUNTER (OUTPATIENT)
Dept: HOSPITAL 35 - HYPER | Age: 50
End: 2020-09-09
Attending: PREVENTIVE MEDICINE
Payer: COMMERCIAL

## 2020-09-09 DIAGNOSIS — T81.31XD: Primary | ICD-10-CM

## 2020-09-09 DIAGNOSIS — E11.69: ICD-10-CM
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
## 2020-09-09 DIAGNOSIS — R60.0: ICD-10-CM

## 2020-09-09 DIAGNOSIS — L97.514: ICD-10-CM

## 2020-09-09 DIAGNOSIS — E44.0: ICD-10-CM

## 2020-09-09 DIAGNOSIS — Z86.718: ICD-10-CM

## 2020-09-09 DIAGNOSIS — E11.40: ICD-10-CM

## 2020-09-09 DIAGNOSIS — M86.372: ICD-10-CM

## 2020-09-09 DIAGNOSIS — E11.621: ICD-10-CM

## 2020-09-09 DIAGNOSIS — Z89.422: ICD-10-CM

## 2020-09-09 DIAGNOSIS — Z86.711: ICD-10-CM

## 2020-09-09 DIAGNOSIS — Z79.84: ICD-10-CM

## 2020-09-10 ENCOUNTER — HOSPITAL ENCOUNTER (OUTPATIENT)
Dept: HOSPITAL 35 - HYPER | Age: 50
End: 2020-09-10
Attending: PREVENTIVE MEDICINE
Payer: COMMERCIAL

## 2020-09-10 DIAGNOSIS — R60.0: ICD-10-CM

## 2020-09-10 DIAGNOSIS — E11.621: ICD-10-CM

## 2020-09-10 DIAGNOSIS — Z86.718: ICD-10-CM

## 2020-09-10 DIAGNOSIS — T87.81: Primary | ICD-10-CM

## 2020-09-10 DIAGNOSIS — M86.672: ICD-10-CM

## 2020-09-10 DIAGNOSIS — Z86.711: ICD-10-CM
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
## 2020-09-10 DIAGNOSIS — Z86.14: ICD-10-CM

## 2020-09-10 DIAGNOSIS — Z79.84: ICD-10-CM

## 2020-09-10 DIAGNOSIS — L97.514: ICD-10-CM

## 2020-09-10 DIAGNOSIS — E44.0: ICD-10-CM

## 2020-09-10 DIAGNOSIS — E11.40: ICD-10-CM

## 2020-09-10 DIAGNOSIS — E11.69: ICD-10-CM

## 2020-09-11 ENCOUNTER — HOSPITAL ENCOUNTER (OUTPATIENT)
Dept: HOSPITAL 35 - HYPER | Age: 50
End: 2020-09-11
Attending: PREVENTIVE MEDICINE
Payer: COMMERCIAL

## 2020-09-11 DIAGNOSIS — R60.0: ICD-10-CM

## 2020-09-11 DIAGNOSIS — Z86.14: ICD-10-CM

## 2020-09-11 DIAGNOSIS — E44.0: ICD-10-CM

## 2020-09-11 DIAGNOSIS — L97.514: ICD-10-CM

## 2020-09-11 DIAGNOSIS — E11.40: ICD-10-CM

## 2020-09-11 DIAGNOSIS — T87.81: Primary | ICD-10-CM

## 2020-09-11 DIAGNOSIS — Z79.82: ICD-10-CM

## 2020-09-11 DIAGNOSIS — E11.621: ICD-10-CM

## 2020-09-11 DIAGNOSIS — Z79.84: ICD-10-CM

## 2020-09-11 DIAGNOSIS — Z86.718: ICD-10-CM

## 2020-09-11 DIAGNOSIS — M86.372: ICD-10-CM

## 2020-09-11 DIAGNOSIS — E11.69: ICD-10-CM
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
## 2020-09-11 DIAGNOSIS — Z86.711: ICD-10-CM

## 2020-09-14 ENCOUNTER — HOSPITAL ENCOUNTER (OUTPATIENT)
Dept: HOSPITAL 35 - HYPER | Age: 50
End: 2020-09-14
Attending: EMERGENCY MEDICINE
Payer: COMMERCIAL

## 2020-09-14 DIAGNOSIS — Z86.718: ICD-10-CM

## 2020-09-14 DIAGNOSIS — E44.0: ICD-10-CM

## 2020-09-14 DIAGNOSIS — L97.514: ICD-10-CM

## 2020-09-14 DIAGNOSIS — E11.69: ICD-10-CM
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
## 2020-09-14 DIAGNOSIS — E11.621: ICD-10-CM

## 2020-09-14 DIAGNOSIS — T81.31XD: Primary | ICD-10-CM

## 2020-09-14 DIAGNOSIS — Z89.422: ICD-10-CM

## 2020-09-14 DIAGNOSIS — E11.40: ICD-10-CM

## 2020-09-14 DIAGNOSIS — Z86.711: ICD-10-CM

## 2020-09-14 DIAGNOSIS — R60.0: ICD-10-CM

## 2020-09-14 DIAGNOSIS — Z79.84: ICD-10-CM

## 2020-09-14 DIAGNOSIS — M86.372: ICD-10-CM

## 2020-09-15 ENCOUNTER — HOSPITAL ENCOUNTER (OUTPATIENT)
Dept: HOSPITAL 35 - HYPER | Age: 50
End: 2020-09-15
Attending: SPECIALIST
Payer: COMMERCIAL

## 2020-09-15 DIAGNOSIS — E11.621: ICD-10-CM

## 2020-09-15 DIAGNOSIS — Z89.422: ICD-10-CM

## 2020-09-15 DIAGNOSIS — Z79.84: ICD-10-CM

## 2020-09-15 DIAGNOSIS — T81.31XD: Primary | ICD-10-CM

## 2020-09-15 DIAGNOSIS — Z86.718: ICD-10-CM

## 2020-09-15 DIAGNOSIS — E11.40: ICD-10-CM

## 2020-09-15 DIAGNOSIS — Z86.711: ICD-10-CM
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
## 2020-09-15 DIAGNOSIS — M86.372: ICD-10-CM

## 2020-09-15 DIAGNOSIS — E44.0: ICD-10-CM

## 2020-09-15 DIAGNOSIS — L97.514: ICD-10-CM

## 2020-09-15 DIAGNOSIS — R60.0: ICD-10-CM

## 2020-09-15 DIAGNOSIS — E11.69: ICD-10-CM

## 2020-09-16 ENCOUNTER — HOSPITAL ENCOUNTER (OUTPATIENT)
Dept: HOSPITAL 35 - HYPER | Age: 50
End: 2020-09-16
Attending: PREVENTIVE MEDICINE
Payer: COMMERCIAL

## 2020-09-16 DIAGNOSIS — Z86.711: ICD-10-CM

## 2020-09-16 DIAGNOSIS — E11.69: ICD-10-CM

## 2020-09-16 DIAGNOSIS — E44.0: ICD-10-CM

## 2020-09-16 DIAGNOSIS — Z86.718: ICD-10-CM
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
## 2020-09-16 DIAGNOSIS — E11.621: ICD-10-CM

## 2020-09-16 DIAGNOSIS — Z89.411: ICD-10-CM

## 2020-09-16 DIAGNOSIS — Z79.84: ICD-10-CM

## 2020-09-16 DIAGNOSIS — Z89.422: ICD-10-CM

## 2020-09-16 DIAGNOSIS — R60.0: ICD-10-CM

## 2020-09-16 DIAGNOSIS — E11.40: ICD-10-CM

## 2020-09-16 DIAGNOSIS — M86.372: ICD-10-CM

## 2020-09-16 DIAGNOSIS — L97.514: ICD-10-CM

## 2020-09-16 DIAGNOSIS — T81.31XD: Primary | ICD-10-CM

## 2020-09-17 ENCOUNTER — HOSPITAL ENCOUNTER (OUTPATIENT)
Dept: HOSPITAL 35 - HYPER | Age: 50
End: 2020-09-17
Attending: PREVENTIVE MEDICINE
Payer: COMMERCIAL

## 2020-09-17 DIAGNOSIS — T81.31XD: Primary | ICD-10-CM
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
## 2020-09-17 DIAGNOSIS — M86.372: ICD-10-CM

## 2020-09-17 DIAGNOSIS — E11.621: ICD-10-CM

## 2020-09-17 DIAGNOSIS — E11.69: ICD-10-CM

## 2020-09-17 DIAGNOSIS — Z89.411: ICD-10-CM

## 2020-09-17 DIAGNOSIS — R60.0: ICD-10-CM

## 2020-09-17 DIAGNOSIS — Z89.422: ICD-10-CM

## 2020-09-17 DIAGNOSIS — Z86.718: ICD-10-CM

## 2020-09-17 DIAGNOSIS — L97.514: ICD-10-CM

## 2020-09-17 DIAGNOSIS — E44.0: ICD-10-CM

## 2020-09-17 DIAGNOSIS — Z86.711: ICD-10-CM

## 2020-09-17 DIAGNOSIS — Z79.84: ICD-10-CM

## 2020-09-17 DIAGNOSIS — E11.40: ICD-10-CM

## 2020-09-21 ENCOUNTER — HOSPITAL ENCOUNTER (OUTPATIENT)
Dept: HOSPITAL 35 - HYPER | Age: 50
End: 2020-09-21
Attending: EMERGENCY MEDICINE
Payer: COMMERCIAL

## 2020-09-21 DIAGNOSIS — R60.0: ICD-10-CM

## 2020-09-21 DIAGNOSIS — Z86.718: ICD-10-CM

## 2020-09-21 DIAGNOSIS — T87.81: Primary | ICD-10-CM

## 2020-09-21 DIAGNOSIS — M86.372: ICD-10-CM

## 2020-09-21 DIAGNOSIS — L97.514: ICD-10-CM

## 2020-09-21 DIAGNOSIS — E11.40: ICD-10-CM

## 2020-09-21 DIAGNOSIS — E11.621: ICD-10-CM

## 2020-09-21 DIAGNOSIS — E44.0: ICD-10-CM

## 2020-09-21 DIAGNOSIS — E66.01: ICD-10-CM

## 2020-09-21 DIAGNOSIS — Z79.84: ICD-10-CM

## 2020-09-21 DIAGNOSIS — Z89.422: ICD-10-CM

## 2020-09-21 DIAGNOSIS — Z86.711: ICD-10-CM
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
## 2020-09-21 DIAGNOSIS — E11.69: ICD-10-CM

## 2020-09-23 ENCOUNTER — HOSPITAL ENCOUNTER (OUTPATIENT)
Dept: HOSPITAL 35 - HYPER | Age: 50
End: 2020-09-23
Attending: PREVENTIVE MEDICINE
Payer: COMMERCIAL

## 2020-09-23 ENCOUNTER — HOSPITAL ENCOUNTER (OUTPATIENT)
Dept: HOSPITAL 35 - SJCVCIMAG | Age: 50
End: 2020-09-23
Attending: PREVENTIVE MEDICINE
Payer: COMMERCIAL

## 2020-09-23 DIAGNOSIS — E66.01: ICD-10-CM

## 2020-09-23 DIAGNOSIS — L97.818: ICD-10-CM

## 2020-09-23 DIAGNOSIS — I73.9: ICD-10-CM

## 2020-09-23 DIAGNOSIS — E11.621: ICD-10-CM

## 2020-09-23 DIAGNOSIS — I70.8: Primary | ICD-10-CM

## 2020-09-23 DIAGNOSIS — Z86.718: ICD-10-CM

## 2020-09-23 DIAGNOSIS — M86.372: ICD-10-CM

## 2020-09-23 DIAGNOSIS — T87.81: Primary | ICD-10-CM

## 2020-09-23 DIAGNOSIS — E11.69: ICD-10-CM

## 2020-09-23 DIAGNOSIS — Z89.422: ICD-10-CM

## 2020-09-23 DIAGNOSIS — Z86.711: ICD-10-CM

## 2020-09-23 DIAGNOSIS — Z79.84: ICD-10-CM

## 2020-09-23 DIAGNOSIS — L97.514: ICD-10-CM

## 2020-09-23 DIAGNOSIS — E44.0: ICD-10-CM

## 2020-09-23 DIAGNOSIS — E11.40: ICD-10-CM

## 2020-09-23 DIAGNOSIS — R60.0: ICD-10-CM

## 2020-09-23 DIAGNOSIS — L97.828: ICD-10-CM
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
## 2020-09-24 ENCOUNTER — HOSPITAL ENCOUNTER (OUTPATIENT)
Dept: HOSPITAL 35 - HYPER | Age: 50
End: 2020-09-24
Attending: PREVENTIVE MEDICINE
Payer: COMMERCIAL

## 2020-09-24 DIAGNOSIS — Z89.422: ICD-10-CM

## 2020-09-24 DIAGNOSIS — E11.621: ICD-10-CM

## 2020-09-24 DIAGNOSIS — R60.0: ICD-10-CM

## 2020-09-24 DIAGNOSIS — E11.69: ICD-10-CM

## 2020-09-24 DIAGNOSIS — E66.01: ICD-10-CM

## 2020-09-24 DIAGNOSIS — Z86.718: ICD-10-CM

## 2020-09-24 DIAGNOSIS — Z79.84: ICD-10-CM

## 2020-09-24 DIAGNOSIS — E44.0: ICD-10-CM

## 2020-09-24 DIAGNOSIS — E11.40: ICD-10-CM

## 2020-09-24 DIAGNOSIS — M86.372: ICD-10-CM
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
## 2020-09-24 DIAGNOSIS — Z86.711: ICD-10-CM

## 2020-09-24 DIAGNOSIS — T87.81: Primary | ICD-10-CM

## 2020-09-24 DIAGNOSIS — L97.514: ICD-10-CM

## 2020-09-25 ENCOUNTER — HOSPITAL ENCOUNTER (OUTPATIENT)
Dept: HOSPITAL 35 - HYPER | Age: 50
End: 2020-09-25
Attending: EMERGENCY MEDICINE
Payer: COMMERCIAL

## 2020-09-25 DIAGNOSIS — E11.621: ICD-10-CM
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
## 2020-09-25 DIAGNOSIS — Z79.82: ICD-10-CM

## 2020-09-25 DIAGNOSIS — E11.69: ICD-10-CM

## 2020-09-25 DIAGNOSIS — Z86.14: ICD-10-CM

## 2020-09-25 DIAGNOSIS — T81.31XD: Primary | ICD-10-CM

## 2020-09-25 DIAGNOSIS — Z79.84: ICD-10-CM

## 2020-09-25 DIAGNOSIS — M86.372: ICD-10-CM

## 2020-09-25 DIAGNOSIS — E44.0: ICD-10-CM

## 2020-09-25 DIAGNOSIS — L97.514: ICD-10-CM

## 2020-09-25 DIAGNOSIS — E11.40: ICD-10-CM

## 2020-09-25 DIAGNOSIS — Z86.718: ICD-10-CM

## 2020-09-25 DIAGNOSIS — Z86.711: ICD-10-CM

## 2020-09-25 DIAGNOSIS — Z89.422: ICD-10-CM

## 2020-09-28 ENCOUNTER — HOSPITAL ENCOUNTER (OUTPATIENT)
Dept: HOSPITAL 35 - HYPER | Age: 50
End: 2020-09-28
Attending: EMERGENCY MEDICINE
Payer: COMMERCIAL

## 2020-09-28 DIAGNOSIS — M86.372: ICD-10-CM

## 2020-09-28 DIAGNOSIS — Z86.718: ICD-10-CM

## 2020-09-28 DIAGNOSIS — Z86.711: ICD-10-CM

## 2020-09-28 DIAGNOSIS — E11.40: ICD-10-CM

## 2020-09-28 DIAGNOSIS — E11.621: ICD-10-CM

## 2020-09-28 DIAGNOSIS — E11.69: ICD-10-CM

## 2020-09-28 DIAGNOSIS — Z89.422: ICD-10-CM

## 2020-09-28 DIAGNOSIS — T81.31XD: Primary | ICD-10-CM

## 2020-09-28 DIAGNOSIS — E44.0: ICD-10-CM

## 2020-09-28 DIAGNOSIS — Z79.84: ICD-10-CM
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
## 2020-09-28 DIAGNOSIS — L97.514: ICD-10-CM

## 2020-09-28 DIAGNOSIS — R60.0: ICD-10-CM

## 2020-09-29 ENCOUNTER — HOSPITAL ENCOUNTER (OUTPATIENT)
Dept: HOSPITAL 35 - HYPER | Age: 50
End: 2020-09-29
Attending: SPECIALIST
Payer: COMMERCIAL

## 2020-09-29 DIAGNOSIS — E11.69: ICD-10-CM

## 2020-09-29 DIAGNOSIS — Z86.711: ICD-10-CM

## 2020-09-29 DIAGNOSIS — R60.0: ICD-10-CM

## 2020-09-29 DIAGNOSIS — M86.372: ICD-10-CM

## 2020-09-29 DIAGNOSIS — L97.514: ICD-10-CM

## 2020-09-29 DIAGNOSIS — E11.40: ICD-10-CM

## 2020-09-29 DIAGNOSIS — Z86.718: ICD-10-CM

## 2020-09-29 DIAGNOSIS — Z79.82: ICD-10-CM
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
## 2020-09-29 DIAGNOSIS — Z79.84: ICD-10-CM

## 2020-09-29 DIAGNOSIS — Z86.14: ICD-10-CM

## 2020-09-29 DIAGNOSIS — T81.31XD: Primary | ICD-10-CM

## 2020-09-29 DIAGNOSIS — E44.0: ICD-10-CM

## 2020-09-29 DIAGNOSIS — E11.621: ICD-10-CM

## 2020-09-30 ENCOUNTER — HOSPITAL ENCOUNTER (OUTPATIENT)
Dept: HOSPITAL 35 - HYPER | Age: 50
End: 2020-09-30
Attending: PREVENTIVE MEDICINE
Payer: COMMERCIAL

## 2020-09-30 DIAGNOSIS — Z86.711: ICD-10-CM

## 2020-09-30 DIAGNOSIS — Z79.84: ICD-10-CM

## 2020-09-30 DIAGNOSIS — Z86.718: ICD-10-CM

## 2020-09-30 DIAGNOSIS — R60.0: ICD-10-CM
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
## 2020-09-30 DIAGNOSIS — Z86.14: ICD-10-CM

## 2020-09-30 DIAGNOSIS — M86.372: ICD-10-CM

## 2020-09-30 DIAGNOSIS — T81.31XD: Primary | ICD-10-CM

## 2020-09-30 DIAGNOSIS — E11.69: ICD-10-CM

## 2020-09-30 DIAGNOSIS — L97.514: ICD-10-CM

## 2020-09-30 DIAGNOSIS — E11.40: ICD-10-CM

## 2020-09-30 DIAGNOSIS — E11.621: ICD-10-CM

## 2020-09-30 DIAGNOSIS — Z79.82: ICD-10-CM

## 2020-09-30 DIAGNOSIS — E44.0: ICD-10-CM

## 2020-10-01 ENCOUNTER — HOSPITAL ENCOUNTER (OUTPATIENT)
Dept: HOSPITAL 35 - HYPER | Age: 50
End: 2020-10-01
Attending: PREVENTIVE MEDICINE
Payer: COMMERCIAL

## 2020-10-01 DIAGNOSIS — E11.40: ICD-10-CM

## 2020-10-01 DIAGNOSIS — M86.372: ICD-10-CM

## 2020-10-01 DIAGNOSIS — T81.31XD: Primary | ICD-10-CM
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
## 2020-10-01 DIAGNOSIS — Z86.718: ICD-10-CM

## 2020-10-01 DIAGNOSIS — Z86.14: ICD-10-CM

## 2020-10-01 DIAGNOSIS — Z79.84: ICD-10-CM

## 2020-10-01 DIAGNOSIS — R60.0: ICD-10-CM

## 2020-10-01 DIAGNOSIS — Z86.711: ICD-10-CM

## 2020-10-01 DIAGNOSIS — E11.621: ICD-10-CM

## 2020-10-01 DIAGNOSIS — L97.514: ICD-10-CM

## 2020-10-01 DIAGNOSIS — E44.0: ICD-10-CM

## 2020-10-01 DIAGNOSIS — E11.69: ICD-10-CM

## 2020-10-02 ENCOUNTER — HOSPITAL ENCOUNTER (OUTPATIENT)
Dept: HOSPITAL 35 - HYPER | Age: 50
End: 2020-10-02
Attending: PREVENTIVE MEDICINE
Payer: COMMERCIAL

## 2020-10-02 DIAGNOSIS — E11.621: ICD-10-CM
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
## 2020-10-02 DIAGNOSIS — Z86.711: ICD-10-CM

## 2020-10-02 DIAGNOSIS — Z79.84: ICD-10-CM

## 2020-10-02 DIAGNOSIS — Z86.718: ICD-10-CM

## 2020-10-02 DIAGNOSIS — E11.40: ICD-10-CM

## 2020-10-02 DIAGNOSIS — L97.514: ICD-10-CM

## 2020-10-02 DIAGNOSIS — T81.31XD: Primary | ICD-10-CM

## 2020-10-02 DIAGNOSIS — R60.0: ICD-10-CM

## 2020-10-02 DIAGNOSIS — M86.372: ICD-10-CM

## 2020-10-02 DIAGNOSIS — E11.69: ICD-10-CM

## 2020-10-02 DIAGNOSIS — E44.0: ICD-10-CM

## 2020-10-02 DIAGNOSIS — Z86.14: ICD-10-CM

## 2020-10-05 ENCOUNTER — HOSPITAL ENCOUNTER (OUTPATIENT)
Dept: HOSPITAL 35 - HYPER | Age: 50
End: 2020-10-05
Attending: EMERGENCY MEDICINE
Payer: COMMERCIAL
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
## 2020-10-05 DIAGNOSIS — Z86.718: ICD-10-CM

## 2020-10-05 DIAGNOSIS — E44.0: ICD-10-CM

## 2020-10-05 DIAGNOSIS — Z79.84: ICD-10-CM

## 2020-10-05 DIAGNOSIS — E11.69: ICD-10-CM

## 2020-10-05 DIAGNOSIS — E11.621: ICD-10-CM

## 2020-10-05 DIAGNOSIS — Z79.82: ICD-10-CM

## 2020-10-05 DIAGNOSIS — M86.372: ICD-10-CM

## 2020-10-05 DIAGNOSIS — Z86.14: ICD-10-CM

## 2020-10-05 DIAGNOSIS — Z89.422: ICD-10-CM

## 2020-10-05 DIAGNOSIS — L97.514: ICD-10-CM

## 2020-10-05 DIAGNOSIS — E11.40: ICD-10-CM

## 2020-10-05 DIAGNOSIS — Z86.711: ICD-10-CM

## 2020-10-05 DIAGNOSIS — T87.81: Primary | ICD-10-CM

## 2020-10-06 ENCOUNTER — HOSPITAL ENCOUNTER (OUTPATIENT)
Dept: HOSPITAL 35 - HYPER | Age: 50
End: 2020-10-06
Attending: SPECIALIST
Payer: COMMERCIAL

## 2020-10-06 DIAGNOSIS — Z86.14: ICD-10-CM

## 2020-10-06 DIAGNOSIS — Z86.718: ICD-10-CM

## 2020-10-06 DIAGNOSIS — E44.0: ICD-10-CM

## 2020-10-06 DIAGNOSIS — Z86.711: ICD-10-CM

## 2020-10-06 DIAGNOSIS — E11.40: ICD-10-CM

## 2020-10-06 DIAGNOSIS — Z89.422: ICD-10-CM
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
## 2020-10-06 DIAGNOSIS — E11.621: ICD-10-CM

## 2020-10-06 DIAGNOSIS — E11.69: ICD-10-CM

## 2020-10-06 DIAGNOSIS — Z79.82: ICD-10-CM

## 2020-10-06 DIAGNOSIS — Z79.84: ICD-10-CM

## 2020-10-06 DIAGNOSIS — L97.514: ICD-10-CM

## 2020-10-06 DIAGNOSIS — M86.372: ICD-10-CM

## 2020-10-06 DIAGNOSIS — T81.31XD: Primary | ICD-10-CM

## 2020-10-07 ENCOUNTER — HOSPITAL ENCOUNTER (OUTPATIENT)
Dept: HOSPITAL 35 - HYPER | Age: 50
End: 2020-10-07
Attending: PREVENTIVE MEDICINE
Payer: COMMERCIAL

## 2020-10-07 DIAGNOSIS — Z79.84: ICD-10-CM

## 2020-10-07 DIAGNOSIS — E11.621: ICD-10-CM

## 2020-10-07 DIAGNOSIS — Z86.711: ICD-10-CM

## 2020-10-07 DIAGNOSIS — E44.0: ICD-10-CM

## 2020-10-07 DIAGNOSIS — E11.40: ICD-10-CM

## 2020-10-07 DIAGNOSIS — Z79.82: ICD-10-CM

## 2020-10-07 DIAGNOSIS — E11.69: ICD-10-CM

## 2020-10-07 DIAGNOSIS — Z86.14: ICD-10-CM

## 2020-10-07 DIAGNOSIS — M86.372: ICD-10-CM

## 2020-10-07 DIAGNOSIS — Z86.718: ICD-10-CM

## 2020-10-07 DIAGNOSIS — T81.31XD: Primary | ICD-10-CM
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
## 2020-10-07 DIAGNOSIS — L97.514: ICD-10-CM

## 2020-10-07 DIAGNOSIS — Z89.422: ICD-10-CM

## 2020-10-08 ENCOUNTER — HOSPITAL ENCOUNTER (OUTPATIENT)
Dept: HOSPITAL 35 - HYPER | Age: 50
End: 2020-10-08
Attending: PREVENTIVE MEDICINE
Payer: COMMERCIAL

## 2020-10-08 DIAGNOSIS — Z86.718: ICD-10-CM

## 2020-10-08 DIAGNOSIS — T81.31XD: Primary | ICD-10-CM

## 2020-10-08 DIAGNOSIS — E44.0: ICD-10-CM

## 2020-10-08 DIAGNOSIS — E11.69: ICD-10-CM

## 2020-10-08 DIAGNOSIS — Z79.84: ICD-10-CM

## 2020-10-08 DIAGNOSIS — L97.514: ICD-10-CM

## 2020-10-08 DIAGNOSIS — R60.0: ICD-10-CM

## 2020-10-08 DIAGNOSIS — Z86.711: ICD-10-CM

## 2020-10-08 DIAGNOSIS — M86.372: ICD-10-CM

## 2020-10-08 DIAGNOSIS — E11.40: ICD-10-CM

## 2020-10-08 DIAGNOSIS — E11.621: ICD-10-CM

## 2020-10-08 DIAGNOSIS — Z89.422: ICD-10-CM
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
## 2020-10-09 ENCOUNTER — HOSPITAL ENCOUNTER (OUTPATIENT)
Dept: HOSPITAL 35 - HYPER | Age: 50
End: 2020-10-09
Attending: EMERGENCY MEDICINE
Payer: COMMERCIAL

## 2020-10-09 DIAGNOSIS — E11.69: ICD-10-CM

## 2020-10-09 DIAGNOSIS — Z86.14: ICD-10-CM

## 2020-10-09 DIAGNOSIS — Z86.718: ICD-10-CM

## 2020-10-09 DIAGNOSIS — E44.0: ICD-10-CM

## 2020-10-09 DIAGNOSIS — Z79.84: ICD-10-CM

## 2020-10-09 DIAGNOSIS — E11.621: ICD-10-CM

## 2020-10-09 DIAGNOSIS — M86.372: ICD-10-CM
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
## 2020-10-09 DIAGNOSIS — E11.40: ICD-10-CM

## 2020-10-09 DIAGNOSIS — T87.81: Primary | ICD-10-CM

## 2020-10-09 DIAGNOSIS — L97.514: ICD-10-CM

## 2020-10-09 DIAGNOSIS — Z79.82: ICD-10-CM

## 2020-10-12 ENCOUNTER — HOSPITAL ENCOUNTER (OUTPATIENT)
Dept: HOSPITAL 35 - HYPER | Age: 50
End: 2020-10-12
Attending: EMERGENCY MEDICINE
Payer: COMMERCIAL
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
## 2020-10-12 DIAGNOSIS — E11.40: ICD-10-CM

## 2020-10-12 DIAGNOSIS — Z86.711: ICD-10-CM

## 2020-10-12 DIAGNOSIS — T87.81: Primary | ICD-10-CM

## 2020-10-12 DIAGNOSIS — L97.514: ICD-10-CM

## 2020-10-12 DIAGNOSIS — Z79.84: ICD-10-CM

## 2020-10-12 DIAGNOSIS — E44.0: ICD-10-CM

## 2020-10-12 DIAGNOSIS — R60.0: ICD-10-CM

## 2020-10-12 DIAGNOSIS — Z86.14: ICD-10-CM

## 2020-10-12 DIAGNOSIS — E11.69: ICD-10-CM

## 2020-10-12 DIAGNOSIS — Z79.82: ICD-10-CM

## 2020-10-12 DIAGNOSIS — Z86.718: ICD-10-CM

## 2020-10-12 DIAGNOSIS — M86.372: ICD-10-CM

## 2020-10-12 DIAGNOSIS — E11.621: ICD-10-CM

## 2020-10-13 ENCOUNTER — HOSPITAL ENCOUNTER (OUTPATIENT)
Dept: HOSPITAL 35 - HYPER | Age: 50
End: 2020-10-13
Attending: SPECIALIST
Payer: COMMERCIAL

## 2020-10-13 DIAGNOSIS — T87.81: Primary | ICD-10-CM

## 2020-10-13 DIAGNOSIS — E44.0: ICD-10-CM

## 2020-10-13 DIAGNOSIS — E11.69: ICD-10-CM

## 2020-10-13 DIAGNOSIS — R60.0: ICD-10-CM

## 2020-10-13 DIAGNOSIS — Z79.82: ICD-10-CM

## 2020-10-13 DIAGNOSIS — Z79.84: ICD-10-CM

## 2020-10-13 DIAGNOSIS — Z86.711: ICD-10-CM

## 2020-10-13 DIAGNOSIS — L97.514: ICD-10-CM
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
## 2020-10-13 DIAGNOSIS — Z86.718: ICD-10-CM

## 2020-10-13 DIAGNOSIS — E11.621: ICD-10-CM

## 2020-10-13 DIAGNOSIS — Z86.14: ICD-10-CM

## 2020-10-13 DIAGNOSIS — E11.40: ICD-10-CM

## 2020-10-13 DIAGNOSIS — M86.372: ICD-10-CM

## 2020-10-13 DIAGNOSIS — Z89.422: ICD-10-CM

## 2020-10-14 ENCOUNTER — HOSPITAL ENCOUNTER (OUTPATIENT)
Dept: HOSPITAL 35 - HYPER | Age: 50
End: 2020-10-14
Attending: PREVENTIVE MEDICINE
Payer: COMMERCIAL

## 2020-10-14 DIAGNOSIS — L97.514: ICD-10-CM

## 2020-10-14 DIAGNOSIS — Z79.84: ICD-10-CM

## 2020-10-14 DIAGNOSIS — R60.0: ICD-10-CM
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
## 2020-10-14 DIAGNOSIS — L97.521: ICD-10-CM

## 2020-10-14 DIAGNOSIS — E11.40: ICD-10-CM

## 2020-10-14 DIAGNOSIS — Z86.711: ICD-10-CM

## 2020-10-14 DIAGNOSIS — E44.0: ICD-10-CM

## 2020-10-14 DIAGNOSIS — E11.69: ICD-10-CM

## 2020-10-14 DIAGNOSIS — Z86.718: ICD-10-CM

## 2020-10-14 DIAGNOSIS — M86.372: ICD-10-CM

## 2020-10-14 DIAGNOSIS — E11.621: ICD-10-CM

## 2020-10-14 DIAGNOSIS — T87.81: Primary | ICD-10-CM

## 2020-10-15 ENCOUNTER — HOSPITAL ENCOUNTER (OUTPATIENT)
Dept: HOSPITAL 35 - HYPER | Age: 50
End: 2020-10-15
Attending: PREVENTIVE MEDICINE
Payer: COMMERCIAL

## 2020-10-15 DIAGNOSIS — E11.621: ICD-10-CM

## 2020-10-15 DIAGNOSIS — E44.0: ICD-10-CM

## 2020-10-15 DIAGNOSIS — R60.0: ICD-10-CM

## 2020-10-15 DIAGNOSIS — E11.69: ICD-10-CM

## 2020-10-15 DIAGNOSIS — M86.372: ICD-10-CM

## 2020-10-15 DIAGNOSIS — Z86.718: ICD-10-CM
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
## 2020-10-15 DIAGNOSIS — Z79.84: ICD-10-CM

## 2020-10-15 DIAGNOSIS — E11.40: ICD-10-CM

## 2020-10-15 DIAGNOSIS — L97.514: ICD-10-CM

## 2020-10-15 DIAGNOSIS — Z86.711: ICD-10-CM

## 2020-10-15 DIAGNOSIS — T87.81: Primary | ICD-10-CM

## 2020-10-15 DIAGNOSIS — L97.521: ICD-10-CM

## 2020-10-16 ENCOUNTER — HOSPITAL ENCOUNTER (OUTPATIENT)
Dept: HOSPITAL 35 - HYPER | Age: 50
End: 2020-10-16
Attending: EMERGENCY MEDICINE
Payer: COMMERCIAL

## 2020-10-16 DIAGNOSIS — M86.372: ICD-10-CM

## 2020-10-16 DIAGNOSIS — L97.514: ICD-10-CM

## 2020-10-16 DIAGNOSIS — Z79.82: ICD-10-CM

## 2020-10-16 DIAGNOSIS — Z86.711: ICD-10-CM

## 2020-10-16 DIAGNOSIS — E11.40: ICD-10-CM

## 2020-10-16 DIAGNOSIS — E11.621: ICD-10-CM

## 2020-10-16 DIAGNOSIS — R60.0: ICD-10-CM

## 2020-10-16 DIAGNOSIS — Z79.84: ICD-10-CM

## 2020-10-16 DIAGNOSIS — Z86.718: ICD-10-CM
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
## 2020-10-16 DIAGNOSIS — E11.69: ICD-10-CM

## 2020-10-16 DIAGNOSIS — E44.0: ICD-10-CM

## 2020-10-16 DIAGNOSIS — Z86.14: ICD-10-CM

## 2020-10-16 DIAGNOSIS — T87.81: Primary | ICD-10-CM

## 2021-02-08 NOTE — ASU PATIENT PROFILE, ADULT - ANESTHESIA, PREVIOUS REACTION, PROFILE
none Simponi Counseling:  I discussed with the patient the risks of golimumab including but not limited to myelosuppression, immunosuppression, autoimmune hepatitis, demyelinating diseases, lymphoma, and serious infections.  The patient understands that monitoring is required including a PPD at baseline and must alert us or the primary physician if symptoms of infection or other concerning signs are noted.

## 2022-05-25 NOTE — ASU DISCHARGE PLAN (ADULT/PEDIATRIC). - ***IN THE EVENT THAT YOU DEVELOP A COMPLICATION AND YOU ARE UNABLE TO REACH YOUR OWN PHYSICIAN, YOU MAY CONTACT:
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 Plan: large guttata with mild folds OU.  Start Marga 128 BID OU - discussed vision limitations after phaco. Recommend consult for possible phaco/DSEK with Dr. Donis Sis Statement Selected

## 2023-04-20 NOTE — NUR
[facility] PT CARE 1900. PT ALERT AND ORIENTED. REASSESSMENT COMPLETE, VSS. PICC
INSERTED AND PLACEMENT CONFIRMED. REPORTS 10/10 PAIN, SEE EMAR. DENIES N/V.
CALL LIGHT AND PERSONAL BELONINGS WITHIN REACH, WILL CONTINUE POC UNTIL EOS. Prescriptions electronically submitted to pharmacy from [facility]